# Patient Record
Sex: FEMALE | Race: BLACK OR AFRICAN AMERICAN | Employment: FULL TIME | URBAN - METROPOLITAN AREA
[De-identification: names, ages, dates, MRNs, and addresses within clinical notes are randomized per-mention and may not be internally consistent; named-entity substitution may affect disease eponyms.]

---

## 2017-06-27 ENCOUNTER — APPOINTMENT (OUTPATIENT)
Dept: CT IMAGING | Age: 27
End: 2017-06-27
Attending: PHYSICIAN ASSISTANT
Payer: MEDICAID

## 2017-06-27 ENCOUNTER — HOSPITAL ENCOUNTER (EMERGENCY)
Age: 27
Discharge: HOME | End: 2017-06-27
Attending: FAMILY MEDICINE
Payer: MEDICAID

## 2017-06-27 VITALS
HEART RATE: 80 BPM | BODY MASS INDEX: 44.3 KG/M2 | DIASTOLIC BLOOD PRESSURE: 81 MMHG | RESPIRATION RATE: 16 BRPM | OXYGEN SATURATION: 99 % | WEIGHT: 250 LBS | HEIGHT: 63 IN | SYSTOLIC BLOOD PRESSURE: 122 MMHG | TEMPERATURE: 98.6 F

## 2017-06-27 DIAGNOSIS — S05.11XA PERIORBITAL CONTUSION OF RIGHT EYE, INITIAL ENCOUNTER: Primary | ICD-10-CM

## 2017-06-27 DIAGNOSIS — H11.31 SUBCONJUNCTIVAL HEMORRHAGE OF RIGHT EYE: ICD-10-CM

## 2017-06-27 DIAGNOSIS — S01.81XA LACERATION OF FACE, INITIAL ENCOUNTER: ICD-10-CM

## 2017-06-27 DIAGNOSIS — Z23 NEED FOR TDAP VACCINATION: ICD-10-CM
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
## 2017-06-27 PROCEDURE — 90715 TDAP VACCINE 7 YRS/> IM: CPT | Performed by: PHYSICIAN ASSISTANT

## 2017-06-27 PROCEDURE — 70486 CT MAXILLOFACIAL W/O DYE: CPT

## 2017-06-27 PROCEDURE — 74011250636 HC RX REV CODE- 250/636: Performed by: PHYSICIAN ASSISTANT

## 2017-06-27 PROCEDURE — 99283 EMERGENCY DEPT VISIT LOW MDM: CPT

## 2017-06-27 PROCEDURE — 90471 IMMUNIZATION ADMIN: CPT

## 2017-06-27 RX ORDER — ERYTHROMYCIN 5 MG/G
OINTMENT OPHTHALMIC
Qty: 1 TUBE | Refills: 0 | Status: SHIPPED | OUTPATIENT
Start: 2017-06-27 | End: 2017-07-04

## 2017-06-27 RX ORDER — IBUPROFEN 600 MG/1
600 TABLET ORAL
Qty: 20 TAB | Refills: 0 | Status: SHIPPED | OUTPATIENT
Start: 2017-06-27 | End: 2017-12-01

## 2017-06-27 RX ORDER — HYDROCODONE BITARTRATE AND ACETAMINOPHEN 5; 325 MG/1; MG/1
1-2 TABLET ORAL
Qty: 6 TAB | Refills: 0 | Status: SHIPPED | OUTPATIENT
Start: 2017-06-27 | End: 2017-12-01

## 2017-06-27 RX ADMIN — TETANUS TOXOID, REDUCED DIPHTHERIA TOXOID AND ACELLULAR PERTUSSIS VACCINE, ADSORBED 0.5 ML: 5; 2.5; 8; 8; 2.5 SUSPENSION INTRAMUSCULAR at 11:38
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
## 2017-06-27 NOTE — DISCHARGE INSTRUCTIONS
Subconjunctival Hemorrhage: Care Instructions  Your Care Instructions    Sometimes small blood vessels in the white of the eye can break, causing a red spot or speck. This is called a subconjunctival hemorrhage. The blood vessels may break when you sneeze, cough, vomit, strain, or bend over. Sometimes there is no clear cause. The blood may look alarming, especially if the spot is large. If there is no pain or vision change, there is usually no reason to worry, and the blood slowly will go away on its own in 2 to 3 weeks. Follow-up care is a key part of your treatment and safety. Be sure to make and go to all appointments, and call your doctor if you are having problems. It's also a good idea to know your test results and keep a list of the medicines you take. How can you care for yourself at home? · Watch for changes in your eye. It is normal for the red spot on your eyeball to change color as it heals. Just like a bruise on your skin, it may change from red to brown to purple to yellow. · Do not take aspirin or products that contain aspirin, which can increase bleeding. Use acetaminophen (Tylenol) if you need pain relief for another problem. · Do not take two or more pain medicines at the same time unless the doctor told you to. Many pain medicines have acetaminophen, which is Tylenol. Too much acetaminophen (Tylenol) can be harmful. When should you call for help? Call your doctor now or seek immediate medical care if:  · You have signs of an eye infection, such as:  ¨ Pus or thick discharge coming from the eye. ¨ Redness or swelling around the eye. ¨ A fever. · You see blood over the black part of your eye (pupil). · You have any changes or problems in your vision. · You have any pain in your eye. Watch closely for changes in your health, and be sure to contact your doctor if:  · You do not get better as expected. Where can you learn more?   Go to http://pablo-willem.info/. Enter K927 in the search box to learn more about \"Subconjunctival Hemorrhage: Care Instructions. \"  Current as of: March 20, 2017  Content Version: 11.3  © 8050-3308 [employer]. Care instructions adapted under license by [employer] (which disclaims liability or warranty for this information). If you have questions about a medical condition or this instruction, always ask your healthcare professional. [address] any warranty or liability for your use of this information. Cuts: Care Instructions  Your Care Instructions  A cut can happen anywhere on your body. Stitches, staples, skin adhesives, or pieces of tape called Steri-Strips are sometimes used to keep the edges of a cut together and help it heal. Steri-Strips can be used by themselves or with stitches or staples. Sometimes cuts are left open. If the cut went deep and through the skin, the doctor may have closed the cut in two layers. A deeper layer of stitches brings the deep part of the cut together. These stitches will dissolve and don't need to be removed. The upper layer closure, which could be stitches, staples, Steri-Strips, or adhesive, is what you see on the cut. A cut is often covered by a bandage. The doctor has checked you carefully, but problems can develop later. If you notice any problems or new symptoms, get medical treatment right away. Follow-up care is a key part of your treatment and safety. Be sure to make and go to all appointments, and call your doctor if you are having problems. It's also a good idea to know your test results and keep a list of the medicines you take. How can you care for yourself at home? If a cut is open or closed  · Prop up the sore area on a pillow anytime you sit or lie down during the next 3 days. Try to keep it above the level of your heart. This will help reduce swelling.   · Keep the cut dry for the first 24 to 48 hours. After this, you can shower if your doctor okays it. Pat the cut dry. · Don't soak the cut, such as in a bathtub. Your doctor will tell you when it's safe to get the cut wet. · After the first 24 to 48 hours, clean the cut with soap and water 2 times a day unless your doctor gives you different instructions. ¨ Don't use hydrogen peroxide or alcohol, which can slow healing. ¨ You may cover the cut with a thin layer of petroleum jelly and a nonstick bandage. ¨ If the doctor put a bandage over the cut, put on a new bandage after cleaning the cut or if the bandage gets wet or dirty. · Avoid any activity that could cause your cut to reopen. · Be safe with medicines. Read and follow all instructions on the label. ¨ If the doctor gave you a prescription medicine for pain, take it as prescribed. ¨ If you are not taking a prescription pain medicine, ask your doctor if you can take an over-the-counter medicine. If the cut is closed with stitches, staples, or Steri-Strips  · Follow the above instructions for open or closed cuts. · Do not remove the stitches or staples on your own. Your doctor will tell you when to come back to have the stitches or staples removed. · Leave Steri-Strips on until they fall off. If the cut is closed with a skin adhesive  · Follow the above instructions for open or closed cuts. · Leave the skin adhesive on your skin until it falls off on its own. This may take 5 to 10 days. · Do not scratch, rub, or pick at the adhesive. · Do not put the sticky part of a bandage directly on the adhesive. · Do not put any kind of ointment, cream, or lotion over the area. This can make the adhesive fall off too soon. Do not use hydrogen peroxide or alcohol, which can slow healing. When should you call for help? Call your doctor now or seek immediate medical care if:  · You have new pain, or your pain gets worse. · The skin near the cut is cold or pale or changes color.   · You have tingling, weakness, or numbness near the cut. · The cut starts to bleed, and blood soaks through the bandage. Oozing small amounts of blood is normal.  · You have trouble moving the area near the cut. · You have symptoms of infection, such as:  ¨ Increased pain, swelling, warmth, or redness around the cut. ¨ Red streaks leading from the cut. ¨ Pus draining from the cut. ¨ A fever. Watch closely for changes in your health, and be sure to contact your doctor if:  · The cut reopens. · You do not get better as expected. Where can you learn more? Go to http://pablo-willem.info/. Enter M735 in the search box to learn more about \"Cuts: Care Instructions. \"  Current as of: March 20, 2017  Content Version: 11.3  © 6819-2620 [employer]. Care instructions adapted under license by [employer] (which disclaims liability or warranty for this information). If you have questions about a medical condition or this instruction, always ask your healthcare professional. [place] [address] any warranty or liability for your use of this information. Bruises: Care Instructions  Your Care Instructions    Bruises occur when small blood vessels under the skin tear or rupture, most often from a twist, bump, or fall. Blood leaks into tissues under the skin and causes a black-and-blue spot that often turns colors, including purplish black, reddish blue, or yellowish green, as the bruise heals. Bruises hurt, but most are not serious and will go away on their own within 2 to 4 weeks. Sometimes, gravity causes them to spread down the body. A leg bruise usually will take longer to heal than a bruise on the face or arms. Follow-up care is a key part of your treatment and safety. Be sure to make and go to all appointments, and call your doctor if you are having problems. Its also a good idea to know your test results and keep a list of the medicines you take.   How can you care for yourself at home? · Take pain medicines exactly as directed. ¨ If the doctor gave you a prescription medicine for pain, take it as prescribed. ¨ If you are not taking a prescription pain medicine, ask your doctor if you can take an over-the-counter medicine. · Put ice or a cold pack on the area for 10 to 20 minutes at a time. Put a thin cloth between the ice and your skin. · If you can, prop up the bruised area on pillows as much as possible for the next few days. Try to keep the bruise above the level of your heart. When should you call for help? Call your doctor now or seek immediate medical care if:  · You have signs of infection, such as:  ¨ Increased pain, swelling, warmth, or redness. ¨ Red streaks leading from the bruise. ¨ Pus draining from the bruise. ¨ A fever. · You have a bruise on your leg and signs of a blood clot, such as:  ¨ Increasing redness and swelling along with warmth, tenderness, and pain in the bruised area. ¨ Pain in your calf, back of the knee, thigh, or groin. ¨ Redness and swelling in your leg or groin. · Your pain gets worse. Watch closely for changes in your health, and be sure to contact your doctor if:  · You do not get better as expected. Where can you learn more? Go to http://pablo-willem.info/. Enter [phone] in the search box to learn more about \"Bruises: Care Instructions. \"  Current as of: March 20, 2017  Content Version: 11.3  © 2597-4656 [employer]. Care instructions adapted under license by [employer] (which disclaims liability or warranty for this information). If you have questions about a medical condition or this instruction, always ask your healthcare professional. [address] any warranty or liability for your use of this information.

## 2017-06-27 NOTE — LETTER
[facility] [place] 
[facility] 
[place] [id number] 
[phone] Work/School Note Date: 6/27/2017 To Whom It May concern: 
 
Virginie Cueva was seen and treated today in the emergency room by the following provider(s): 
Attending Provider: Diego Mayen MD 
Physician Assistant: Bird Knight PA-C. Virginie Cueva may return to work on 6/28/17. Sincerely, Toro Mackey PA-C
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
## 2017-12-01 ENCOUNTER — HOSPITAL ENCOUNTER (EMERGENCY)
Age: 27
Discharge: HOME | End: 2017-12-01
Attending: EMERGENCY MEDICINE
Payer: MEDICAID

## 2017-12-01 VITALS
HEART RATE: 82 BPM | DIASTOLIC BLOOD PRESSURE: 88 MMHG | RESPIRATION RATE: 16 BRPM | BODY MASS INDEX: 42.68 KG/M2 | OXYGEN SATURATION: 99 % | WEIGHT: 250 LBS | SYSTOLIC BLOOD PRESSURE: 141 MMHG | HEIGHT: 64 IN | TEMPERATURE: 98.4 F

## 2017-12-01 DIAGNOSIS — L02.412 ABSCESS OF LEFT AXILLA: Primary | ICD-10-CM

## 2017-12-01 PROCEDURE — 75810000289 HC I&D ABSCESS SIMP/COMP/MULT

## 2017-12-01 PROCEDURE — 99283 EMERGENCY DEPT VISIT LOW MDM: CPT

## 2017-12-01 PROCEDURE — 74011000250 HC RX REV CODE- 250: Performed by: PHYSICIAN ASSISTANT

## 2017-12-01 PROCEDURE — 74011250637 HC RX REV CODE- 250/637: Performed by: PHYSICIAN ASSISTANT

## 2017-12-01 RX ORDER — LIDOCAINE HYDROCHLORIDE 20 MG/ML
12 INJECTION, SOLUTION INFILTRATION; PERINEURAL
Status: COMPLETED | OUTPATIENT
Start: 2017-12-01 | End: 2017-12-01

## 2017-12-01 RX ORDER — HYDROCODONE BITARTRATE AND ACETAMINOPHEN 5; 325 MG/1; MG/1
1 TABLET ORAL
Qty: 20 TAB | Refills: 0 | Status: SHIPPED | OUTPATIENT
Start: 2017-12-01 | End: 2018-05-25

## 2017-12-01 RX ORDER — IBUPROFEN 600 MG/1
600 TABLET ORAL
Status: COMPLETED | OUTPATIENT
Start: 2017-12-01 | End: 2017-12-01

## 2017-12-01 RX ORDER — ACETAMINOPHEN AND CODEINE PHOSPHATE 300; 30 MG/1; MG/1
1 TABLET ORAL
COMMUNITY
End: 2018-05-25

## 2017-12-01 RX ORDER — SULFAMETHOXAZOLE AND TRIMETHOPRIM 800; 160 MG/1; MG/1
1 TABLET ORAL 2 TIMES DAILY
COMMUNITY
End: 2018-05-25

## 2017-12-01 RX ADMIN — IBUPROFEN 600 MG: 600 TABLET, FILM COATED ORAL at 23:26

## 2017-12-01 RX ADMIN — LIDOCAINE HYDROCHLORIDE 240 MG: 20 INJECTION, SOLUTION INFILTRATION; PERINEURAL at 22:32

## 2017-12-02 NOTE — ED PROVIDER NOTES
EMERGENCY DEPARTMENT HISTORY AND PHYSICAL EXAM    Date: 12/1/2017  Patient Name: Burak Hewitt    History of Presenting Illness     Chief Complaint   Patient presents with    Abscess         History Provided By: Patient    Chief Complaint: abscess  Duration: 7 Days  Timing:  Gradual  Location: left axilla  Associated Symptoms: burning to abscess area    Additional History (Context):   10:45 PM   Burak Hewitt is a 33 y.o. female who presents to the emergency department C/O abscess to left axilla for the past week. Associated sxs include burning to abscess area. Pt was seen at urgent care today where they attempted to fany abscess but pt states it was painful and she was not numbed properly so she did not finish with procedure. Pt was given bactrim and tylenol #3 yesterday when she was seen at urgent care. Pt denies fever and any other sxs or complaints. PCP: None    Current Facility-Administered Medications   Medication Dose Route Frequency Provider Last Rate Last Dose    ibuprofen (MOTRIN) tablet 600 mg  600 mg Oral NOW [place]         Current Outpatient Prescriptions   Medication Sig Dispense Refill    trimethoprim-sulfamethoxazole (BACTRIM DS) 160-800 mg per tablet Take 1 Tab by mouth two (2) times a day.  acetaminophen-codeine (TYLENOL-CODEINE #3) 300-30 mg per tablet Take 1 Tab by mouth every four (4) hours as needed for Pain.  HYDROcodone-acetaminophen (NORCO) 5-325 mg per tablet Take 1 Tab by mouth every four (4) hours as needed for Pain. Max Daily Amount: 6 Tabs.  20 Tab 0       Past History     Past Medical History:  Past Medical History:   Diagnosis Date    Depression     Morbid obesity ([address]     Smoking     Suicidal thoughts        Past Surgical History:  Past Surgical History:   Procedure Laterality Date    CYSTOSCOPY      stone extraction    HX CHOLECYSTECTOMY         Family History:  Family History   Problem Relation Age of Onset    Obesity Father        Social History:  Social History   Substance Use Topics    Smoking status: Former Smoker     Packs/day: 0.25    Smokeless tobacco: None    Alcohol use 1.0 oz/week     2 Cans of beer per week      Comment: social pt reports seldom use can not recall last use       Allergies:  No Known Allergies      Review of Systems   Review of Systems   Constitutional: Negative for fever. Skin:        (+) abscess   All other systems reviewed and are negative. Physical Exam     Vitals:    12/01/17 2222   BP: (!) 165/114   Pulse: (!) 101   Resp: 16   Temp: 98.4 °F (36.9 °C)   SpO2: 100%   Weight: 113.4 kg (250 lb)   Height: 5' 4\" (1.626 m)     Physical Exam   Constitutional: She is oriented to person, place, and time. She appears well-developed and well-nourished. No distress. HENT:   Head: Normocephalic and atraumatic. Eyes: Conjunctivae and EOM are normal. Pupils are equal, round, and reactive to light. Neck: Normal range of motion. Neck supple. Cardiovascular: Normal rate and regular rhythm. Pulmonary/Chest: Effort normal and breath sounds normal.   Musculoskeletal: Normal range of motion. Neurological: She is alert and oriented to person, place, and time. Skin: Skin is warm and dry. Right axilla with 4cm raised fluctuant tender asbcess   Psychiatric: She has a normal mood and affect. Her behavior is normal.   Nursing note and vitals reviewed. Diagnostic Study Results     Labs -   No results found for this or any previous visit (from the past 12 hour(s)). Radiologic Studies -   No orders to display     CT Results  (Last 48 hours)    None        CXR Results  (Last 48 hours)    None          Medications given in the ED-  Medications   ibuprofen (MOTRIN) tablet 600 mg (not administered)   lidocaine (XYLOCAINE) 20 mg/mL (2 %) injection 240 mg (240 mg SubCUTAneous Given by Provider 12/1/17 2232)         Medical Decision Making   I am the first provider for this patient.     I reviewed the vital signs, available nursing notes, past medical history, past surgical history, family history and social history. Vital Signs-Reviewed the patient's vital signs. Procedures:  I&D Abcess Complex  Date/Time: 12/1/2017 10:49 PM  Performed by: Octavio Tolbert  Authorized by: Bull Steven     Consent:     Consent obtained:  Verbal    Consent given by:  Patient  Location:     Type:  Abscess    Location: left axilla. Pre-procedure details:     Skin preparation:  Betadine  Anesthesia (see MAR for exact dosages): Anesthesia method:  Local infiltration    Local anesthetic:  Lidocaine 2% w/o epi  Procedure type:     Complexity:  Simple  Procedure details:     Needle aspiration: no      Incision types:  Stab incision    Incision depth:  Dermal    Scalpel blade:  11    Wound management:  Probed and deloculated    Drainage:  Bloody and purulent    Drainage amount:  Copious    Wound treatment:  Wound left open    Packing materials:  None  Post-procedure details:     Patient tolerance of procedure: Tolerated well, no immediate complications          ED Course:   10:45 PM Initial assessment performed. The patients presenting problems have been discussed, and they are in agreement with the care plan formulated and outlined with them. I have encouraged them to ask questions as they arise throughout their visit. Diagnosis and Disposition       DISCHARGE NOTE:  11:20 PM  Pau Bartlett  results have been reviewed with her. She has been counseled regarding her diagnosis, treatment, and plan. She verbally conveys understanding and agreement of the signs, symptoms, diagnosis, treatment and prognosis and additionally agrees to follow up as discussed. She also agrees with the care-plan and conveys that all of her questions have been answered.   I have also provided discharge instructions for her that include: educational information regarding their diagnosis and treatment, and list of reasons why they would want to return to the ED prior to their follow-up appointment, should her condition change. She has been provided with education for proper emergency department utilization. CLINICAL IMPRESSION:    1. Abscess of left axilla        PLAN:  1. D/C Home  2. Current Discharge Medication List      START taking these medications    Details   HYDROcodone-acetaminophen (NORCO) 5-325 mg per tablet Take 1 Tab by mouth every four (4) hours as needed for Pain. Max Daily Amount: 6 Tabs. Qty: 20 Tab, Refills: 0           3. Follow-up Information     Follow up With Details Comments Contact Info    [facility] Schedule an appointment as soon as possible for a visit in 2 days  [id number] [facility], [address] [facility] [address]    [facility]  As needed, If symptoms worsen [address] [id number] [phone]        _______________________________    Attestations: This note is prepared by Liliane Shrestha , acting as Scribe for Jian Guerin PA-C. Jian Guerin PA-C:  The scribe's documentation has been prepared under my direction and personally reviewed by me in its entirety.   I confirm that the note above accurately reflects all work, treatment, procedures, and medical decision making performed by me.  _______________________________

## 2017-12-02 NOTE — DISCHARGE INSTRUCTIONS
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
## 2018-05-25 ENCOUNTER — HOSPITAL ENCOUNTER (EMERGENCY)
Age: 28
Discharge: HOME | End: 2018-05-25
Attending: EMERGENCY MEDICINE
Payer: SELF-PAY

## 2018-05-25 VITALS
BODY MASS INDEX: 44.3 KG/M2 | HEIGHT: 63 IN | TEMPERATURE: 98.6 F | HEART RATE: 86 BPM | SYSTOLIC BLOOD PRESSURE: 123 MMHG | RESPIRATION RATE: 16 BRPM | DIASTOLIC BLOOD PRESSURE: 76 MMHG | WEIGHT: 250 LBS | OXYGEN SATURATION: 99 %

## 2018-05-25 DIAGNOSIS — S39.012A STRAIN OF LUMBAR REGION, INITIAL ENCOUNTER: Primary | ICD-10-CM

## 2018-05-25 LAB
APPEARANCE UR: ABNORMAL
BACTERIA URNS QL MICRO: ABNORMAL /HPF
BILIRUB UR QL: NEGATIVE
COLOR UR: YELLOW
EPITH CASTS URNS QL MICRO: ABNORMAL /LPF (ref 0–5)
GLUCOSE UR STRIP.AUTO-MCNC: NEGATIVE MG/DL
HCG UR QL: NEGATIVE
HGB UR QL STRIP: ABNORMAL
KETONES UR QL STRIP.AUTO: NEGATIVE MG/DL
LEUKOCYTE ESTERASE UR QL STRIP.AUTO: ABNORMAL
NITRITE UR QL STRIP.AUTO: NEGATIVE
PH UR STRIP: 6 [PH] (ref 5–8)
PROT UR STRIP-MCNC: NEGATIVE MG/DL
RBC #/AREA URNS HPF: ABNORMAL /HPF (ref 0–5)
SP GR UR REFRACTOMETRY: 1.02 (ref 1–1.03)
UROBILINOGEN UR QL STRIP.AUTO: 1 EU/DL (ref 0.2–1)
WBC URNS QL MICRO: ABNORMAL /HPF (ref 0–5)

## 2018-05-25 PROCEDURE — 81025 URINE PREGNANCY TEST: CPT

## 2018-05-25 PROCEDURE — 99282 EMERGENCY DEPT VISIT SF MDM: CPT

## 2018-05-25 PROCEDURE — 81001 URINALYSIS AUTO W/SCOPE: CPT | Performed by: EMERGENCY MEDICINE

## 2018-05-25 RX ORDER — CYCLOBENZAPRINE HCL 10 MG
10 TABLET ORAL 2 TIMES DAILY
Qty: 12 TAB | Refills: 0 | Status: SHIPPED | OUTPATIENT
Start: 2018-05-25 | End: 2018-08-18

## 2018-05-25 RX ORDER — NAPROXEN 500 MG/1
500 TABLET ORAL 2 TIMES DAILY WITH MEALS
Qty: 20 TAB | Refills: 0 | Status: SHIPPED | OUTPATIENT
Start: 2018-05-25 | End: 2018-08-18

## 2018-05-25 NOTE — ED PROVIDER NOTES
EMERGENCY DEPARTMENT HISTORY AND PHYSICAL EXAM    Date: 5/25/2018  Patient Name: Eleuterio Gil    History of Presenting Illness     Chief Complaint   Patient presents with    Back Pain         History Provided By: Patient    Chief Complaint: back pain  Duration: 2 Days  Timing:  Acute  Location: lower  Quality: Burning  Severity: 6 out of 10  Associated Symptoms: denies any other associated signs or symptoms    Additional History (Context):   2:02 PM  Eleuterio Gil is a 33 y.o. female who presents to the emergency department C/O 6/10 lower back pain, described as burning, onset 2 days ago s/p lifting a heavy box. No associated symptoms. Pt is not concerned for pregnancy. Birth control implant. Pt denies incontinence, numbness, weakness, and any other Sx or complaints. PCP: None      Past History     Past Medical History:  Past Medical History:   Diagnosis Date    Depression     Morbid obesity ([address]     Smoking     Suicidal thoughts        Past Surgical History:  Past Surgical History:   Procedure Laterality Date    CYSTOSCOPY      stone extraction    HX CHOLECYSTECTOMY         Family History:  Family History   Problem Relation Age of Onset    Obesity Father        Social History:  Social History   Substance Use Topics    Smoking status: Former Smoker     Packs/day: 0.25    Smokeless tobacco: Not on file    Alcohol use 1.0 oz/week     2 Cans of beer per week      Comment: social pt reports seldom use can not recall last use       Allergies:  No Known Allergies      Review of Systems   Review of Systems   Gastrointestinal:        (-) bowel incontinence   Genitourinary:        (-) urinary incontinence   Musculoskeletal: Positive for back pain. Neurological: Negative for weakness and numbness. All other systems reviewed and are negative.       Physical Exam     Vitals:    05/25/18 1346   BP: 123/76   Pulse: 86   Resp: 16   Temp: 98.6 °F (37 °C)   SpO2: 99%   Weight: 113.4 kg (250 lb)   Height: 5' 3\" (1.6 m)     Physical Exam   Constitutional: She is oriented to person, place, and time. She appears well-developed and well-nourished. No distress. Obese in NAD   HENT:   Head: Normocephalic and atraumatic. Eyes: Conjunctivae and EOM are normal. Pupils are equal, round, and reactive to light. Neck: Normal range of motion. Neck supple. Cardiovascular: Normal rate and regular rhythm. Pulmonary/Chest: Effort normal and breath sounds normal.   Abdominal: Soft. Bowel sounds are normal. She exhibits no distension. There is no tenderness. There is no rebound and no guarding. Musculoskeletal: Normal range of motion. She exhibits tenderness. She exhibits no edema. Cervical back: Normal.        Thoracic back: Normal.        Lumbar back: She exhibits tenderness and pain. She exhibits no bony tenderness. Back:    Negative SLR BLE's, NVI   Neurological: She is alert and oriented to person, place, and time. Skin: Skin is warm and dry. Psychiatric: She has a normal mood and affect. Her behavior is normal.   Nursing note and vitals reviewed.         Diagnostic Study Results     Labs -     Recent Results (from the past 12 hour(s))   URINALYSIS W/ RFLX MICROSCOPIC    Collection Time: 05/25/18  1:50 PM   Result Value Ref Range    Color YELLOW      Appearance TURBID      Specific gravity 1.024 1.005 - 1.030      pH (UA) 6.0 5.0 - 8.0      Protein NEGATIVE  NEG mg/dL    Glucose NEGATIVE  NEG mg/dL    Ketone NEGATIVE  NEG mg/dL    Bilirubin NEGATIVE  NEG      Blood TRACE (A) NEG      Urobilinogen 1.0 0.2 - 1.0 EU/dL    Nitrites NEGATIVE  NEG      Leukocyte Esterase MODERATE (A) NEG     URINE MICROSCOPIC ONLY    Collection Time: 05/25/18  1:50 PM   Result Value Ref Range    WBC 11 to 20 0 - 5 /hpf    RBC 0 to 3 0 - 5 /hpf    Epithelial cells 4+ 0 - 5 /lpf    Bacteria 1+ (A) NEG /hpf   HCG URINE, QL. - POC    Collection Time: 05/25/18  1:51 PM   Result Value Ref Range    Pregnancy test,urine (POC) NEGATIVE  NEG Radiologic Studies -   No orders to display     CT Results  (Last 48 hours)    None        CXR Results  (Last 48 hours)    None          Medications given in the ED-  Medications - No data to display      Medical Decision Making   I am the first provider for this patient. I reviewed the vital signs, available nursing notes, past medical history, past surgical history, family history and social history. Vital Signs-Reviewed the patient's vital signs. Pulse Oximetry Analysis - 99% on RA     Records Reviewed: Nursing Notes and Old Medical Records    Procedures:  Procedures    ED Course:   2:02 PM Initial assessment performed. The patients presenting problems have been discussed, and they are in agreement with the care plan formulated and outlined with them. I have encouraged them to ask questions as they arise throughout their visit. Diagnosis and Disposition       DISCHARGE NOTE:  2:08 PM  Romy Kuo  results have been reviewed with her. She has been counseled regarding her diagnosis, treatment, and plan. She verbally conveys understanding and agreement of the signs, symptoms, diagnosis, treatment and prognosis and additionally agrees to follow up as discussed. She also agrees with the care-plan and conveys that all of her questions have been answered. I have also provided discharge instructions for her that include: educational information regarding their diagnosis and treatment, and list of reasons why they would want to return to the ED prior to their follow-up appointment, should her condition change. She has been provided with education for proper emergency department utilization. CLINICAL IMPRESSION:    1. Strain of lumbar region, initial encounter        PLAN:  1. D/C Home  2. Current Discharge Medication List      START taking these medications    Details   cyclobenzaprine (FLEXERIL) 10 mg tablet Take 1 Tab by mouth two (2) times a day.   Qty: 12 Tab, Refills: 0      naproxen (NAPROSYN) 500 mg tablet Take 1 Tab by mouth two (2) times daily (with meals). Qty: 20 Tab, Refills: 0           3. Follow-up Information     Follow up With Details Comments Contact Info    Yashira Paniagua MD Schedule an appointment as soon as possible for a visit For primary care follow up [id number] [facility] [address]      [facility] EM[facility] Go to As needed, as symptoms worsen [address] [id number]  [phone]        _______________________________    Attestations: This note is prepared by Monisha Dai, acting as Scribe for Jian Guerin PA-C. Jian Guerin PA-C:  The scribe's documentation has been prepared under my direction and personally reviewed by me in its entirety.   I confirm that the note above accurately reflects all work, treatment, procedures, and medical decision making performed by me.  _______________________________

## 2018-05-25 NOTE — DISCHARGE INSTRUCTIONS
Back Strain: Care Instructions  Your Care Instructions    Back strain happens when you overstretch, or pull, a muscle in your back. You may hurt your back in an accident or when you exercise or lift something. Most back pain will get better with rest and time. You can take care of yourself at home to help your back heal.  Follow-up care is a key part of your treatment and safety. Be sure to make and go to all appointments, and call your doctor if you are having problems. It's also a good idea to know your test results and keep a list of the medicines you take. How can you care for yourself at home? · Try to stay as active as you can, but stop or reduce any activity that causes pain. · Put ice or a cold pack on the sore muscle for 10 to 20 minutes at a time to stop swelling. Try this every 1 to 2 hours for 3 days (when you are awake) or until the swelling goes down. Put a thin cloth between the ice pack and your skin. · After 2 or 3 days, apply a heating pad on low or a warm cloth to your back. Some doctors suggest that you go back and forth between hot and cold treatments. · Take pain medicines exactly as directed. ¨ If the doctor gave you a prescription medicine for pain, take it as prescribed. ¨ If you are not taking a prescription pain medicine, ask your doctor if you can take an over-the-counter medicine. · Try sleeping on your side with a pillow between your legs. Or put a pillow under your knees when you lie on your back. These measures can ease pain in your lower back. · Return to your usual level of activity slowly. When should you call for help? Call 911 anytime you think you may need emergency care. For example, call if:  ? · You are unable to move a leg at all. ?Call your doctor now or seek immediate medical care if:  ? · You have new or worse symptoms in your legs, belly, or buttocks. Symptoms may include:  ¨ Numbness or tingling. ¨ Weakness. ¨ Pain.    ? · You lose bladder or bowel control. ? Watch closely for changes in your health, and be sure to contact your doctor if you are not getting better as expected. Where can you learn more? Go to http://pablo-willem.info/. Enter R451 in the search box to learn more about \"Back Strain: Care Instructions. \"  Current as of: March 21, 2017  Content Version: 11.4  © 6715-6430 [employer]. Care instructions adapted under license by [employer] (which disclaims liability or warranty for this information). If you have questions about a medical condition or this instruction, always ask your healthcare professional. [place] [address] any warranty or liability for your use of this information.

## 2018-05-25 NOTE — LETTER
[facility] [place] 
[facility] EMERGENCY DEPT 
[address] [address] 
[phone] Work/School Note Date: 5/25/2018 To Whom It May concern: 
 
Feliz Kerr was seen and treated today in the emergency room by the following provider(s): 
Attending Provider: Oscar Campo MD 
Physician Assistant: CASYS Pedro. Feliz Kerr may return to work on 5/26/18.  
 
Sincerely, 
 
 
 
 
Lenka Champion PA-C

## 2018-08-18 ENCOUNTER — HOSPITAL ENCOUNTER (EMERGENCY)
Age: 28
Discharge: HOME | End: 2018-08-18
Attending: INTERNAL MEDICINE
Payer: SELF-PAY

## 2018-08-18 VITALS
HEIGHT: 63 IN | BODY MASS INDEX: 44.3 KG/M2 | RESPIRATION RATE: 16 BRPM | DIASTOLIC BLOOD PRESSURE: 82 MMHG | HEART RATE: 74 BPM | TEMPERATURE: 98.3 F | WEIGHT: 250 LBS | SYSTOLIC BLOOD PRESSURE: 126 MMHG | OXYGEN SATURATION: 100 %

## 2018-08-18 DIAGNOSIS — N94.9 GENITAL LESION, FEMALE: Primary | ICD-10-CM

## 2018-08-18 LAB — HCG UR QL: NEGATIVE

## 2018-08-18 PROCEDURE — 99284 EMERGENCY DEPT VISIT MOD MDM: CPT

## 2018-08-18 PROCEDURE — 81025 URINE PREGNANCY TEST: CPT

## 2018-08-18 PROCEDURE — 86780 TREPONEMA PALLIDUM: CPT | Performed by: PHYSICIAN ASSISTANT

## 2018-08-18 PROCEDURE — 74011000250 HC RX REV CODE- 250: Performed by: PHYSICIAN ASSISTANT

## 2018-08-18 PROCEDURE — 87529 HSV DNA AMP PROBE: CPT | Performed by: PHYSICIAN ASSISTANT

## 2018-08-18 PROCEDURE — 74011250637 HC RX REV CODE- 250/637: Performed by: PHYSICIAN ASSISTANT

## 2018-08-18 RX ORDER — CEPHALEXIN 500 MG/1
500 CAPSULE ORAL 4 TIMES DAILY
Qty: 28 CAP | Refills: 0 | Status: SHIPPED | OUTPATIENT
Start: 2018-08-18 | End: 2018-08-25

## 2018-08-18 RX ORDER — LIDOCAINE HYDROCHLORIDE 20 MG/ML
JELLY TOPICAL
Status: COMPLETED | OUTPATIENT
Start: 2018-08-18 | End: 2018-08-18

## 2018-08-18 RX ORDER — HYDROCODONE BITARTRATE AND ACETAMINOPHEN 5; 325 MG/1; MG/1
1 TABLET ORAL
Status: COMPLETED | OUTPATIENT
Start: 2018-08-18 | End: 2018-08-18

## 2018-08-18 RX ORDER — IBUPROFEN 600 MG/1
600 TABLET ORAL
Qty: 20 TAB | Refills: 0 | Status: SHIPPED | OUTPATIENT
Start: 2018-08-18 | End: 2022-01-02

## 2018-08-18 RX ORDER — HYDROCODONE BITARTRATE AND ACETAMINOPHEN 5; 325 MG/1; MG/1
1 TABLET ORAL
Qty: 8 TAB | Refills: 0 | Status: SHIPPED | OUTPATIENT
Start: 2018-08-18 | End: 2022-01-02

## 2018-08-18 RX ORDER — SULFAMETHOXAZOLE AND TRIMETHOPRIM 800; 160 MG/1; MG/1
1 TABLET ORAL 2 TIMES DAILY
Qty: 14 TAB | Refills: 0 | Status: SHIPPED | OUTPATIENT
Start: 2018-08-18 | End: 2018-08-25

## 2018-08-18 RX ADMIN — LIDOCAINE HYDROCHLORIDE: 20 JELLY TOPICAL at 10:34

## 2018-08-18 RX ADMIN — HYDROCODONE BITARTRATE AND ACETAMINOPHEN 1 TABLET: 5; 325 TABLET ORAL at 10:34

## 2018-08-18 NOTE — Clinical Note
SPECIFIC PATIENT INSTRUCTIONS FROM THE PHYSICIAN WHO TREATED YOU IN THE ER TODAY: 
1. Return if worsening pain, redness, warmth, discharge, fevers or for any symptoms which worry you. 2. Keflex and Bactrim DS as prescribed until finished. 3. Use a war m compress, such as a damp wash cloth warmed in the microwave, applied to the abscess area 4-6 times a day to help bring it to a head and open. 4. You may take over the counter pain reliever for pain control. Norco for more severe pain. 5. Follow up w ith your primary doctor or return to the ER in 1-2 days for wound check.

## 2018-08-18 NOTE — DISCHARGE INSTRUCTIONS

## 2018-08-18 NOTE — ED PROVIDER NOTES
EMERGENCY DEPARTMENT HISTORY AND PHYSICAL EXAM    Date: 8/18/2018  Patient Name: Duyen Cueva    History of Presenting Illness     Chief Complaint   Patient presents with    Abscess         History Provided By: Patient    Chief Complaint: Abscess  Duration: 1 Weeks  Timing:  Acute and Worsening  Location: Left lower vagina  Quality: Pressure  Severity: 8 out of 10  Associated Symptoms: denies any other associated signs or symptoms    Additional History (Context):   10:13 AM  Duyen Cueva is a 33 y.o. female with PMHx of depression who presents to the emergency department C/O worsening abscess to left labia described as pressure onset 1 week ago. No associated sxs. Pt reports she had intercourse recently and noticed an abscess to her left lower vagina. Pt reports Hx of one prior abscess to her left axilla. Pt is currently on her menses. Pt denies fever, drainage from the abscess, Hx MRSA, dysuria, frequency, and any other sxs or complaints. She took 200mg ibuprofen this morning without relief. PCP: None        Past History     Past Medical History:  Past Medical History:   Diagnosis Date    Depression     Morbid obesity ([address]     Smoking     Suicidal thoughts        Past Surgical History:  Past Surgical History:   Procedure Laterality Date    CYSTOSCOPY      stone extraction    HX CHOLECYSTECTOMY         Family History:  Family History   Problem Relation Age of Onset    Obesity Father        Social History:  Social History   Substance Use Topics    Smoking status: Former Smoker     Packs/day: 0.25    Smokeless tobacco: Never Used    Alcohol use No      Comment: social pt reports seldom use can not recall last use       Allergies:  No Known Allergies      Review of Systems   Review of Systems   Constitutional: Negative for fever. Genitourinary: Positive for genital sores. Negative for dysuria and frequency.    Skin:        (+) Abscess to right lower vagina without drainage   All other systems reviewed and are negative. Physical Exam     Vitals:    08/18/18 0949   BP: 126/82   Pulse: 74   Resp: 16   Temp: 98.3 °F (36.8 °C)   SpO2: 100%   Weight: 113.4 kg (250 lb)   Height: 5' 3\" (1.6 m)     Physical Exam   Constitutional: She appears well-developed and well-nourished. No distress. HENT:   Head: Normocephalic and atraumatic. Right Ear: External ear normal.   Left Ear: External ear normal.   Nose: Nose normal.   Eyes: Conjunctivae are normal.   Neck: Normal range of motion. Cardiovascular: Normal rate, regular rhythm and normal heart sounds. Pulmonary/Chest: Effort normal and breath sounds normal. No respiratory distress. She has no wheezes. Genitourinary: Vagina normal.   Genitourinary Comments: Small, dime sized ulcerative area with fluctuance to the left labia majora. With palpation, purulent drainage is expressed. No other lesions appreciated. Neurological: She is alert. Skin: Skin is warm and dry. She is not diaphoretic. Psychiatric: She has a normal mood and affect. Nursing note and vitals reviewed. Diagnostic Study Results     Labs -     Recent Results (from the past 12 hour(s))   HCG URINE, QL. - POC    Collection Time: 08/18/18 10:44 AM   Result Value Ref Range    Pregnancy test,urine (POC) NEGATIVE  NEG         Radiologic Studies -   No orders to display     CT Results  (Last 48 hours)    None        CXR Results  (Last 48 hours)    None          Medications given in the ED-  Medications   lidocaine (XYLOCAINE) 2 % jelly ( Topical Given 8/18/18 1034)   HYDROcodone-acetaminophen (NORCO) 5-325 mg per tablet 1 Tab (1 Tab Oral Given 8/18/18 1034)         Medical Decision Making   I am the first provider for this patient. I reviewed the vital signs, available nursing notes, past medical history, past surgical history, family history and social history. Vital Signs-Reviewed the patient's vital signs.     Pulse Oximetry Analysis - 100% on RA       Records Reviewed: Nursing Notes and Old Medical Records    Provider Notes (Medical Decision Making): Appears well and non-toxic, presenting with ulcerative lesion to the left labia with purulence expressed with palpation. Most c/w abscess but as it is ulcerative, lesion swabbed for HSV and syphilis blood test sent to lab. Due to pain, patient declined I&D today and terminated expression by palpation. As it is draining on its own, I feel she can try warm compresses and abx, return for worsening symptoms. Pt agrees with plan. Based on today's assessment, I feel the patient is stable for discharge to home with outpatient follow up. Return precautions and referrals provided. Procedures:  I&D Abcess Simple  Date/Time: 8/18/2018 11:23 AM  Performed by: Ava Faith  Authorized by: Ava Faith     Consent:     Consent obtained:  Verbal    Consent given by:  Patient    Risks discussed:  Incomplete drainage    Alternatives discussed:  Delayed treatment  Location:     Type:  Abscess    Location:  Anogenital    Anogenital location: labia - left. Pre-procedure details:     Skin preparation:  Betadine  Anesthesia (see MAR for exact dosages): Anesthesia method:  None  Procedure type:     Complexity:  Simple  Procedure details:     Drainage:  Purulent    Drainage amount:  Scant  Post-procedure details:     Patient tolerance of procedure:  Procedure terminated at patient's request  Comments:      Area cleaned with betadine, purulent drainage expressed with palpation. No incision was made. Procedure was terminated by the patient's request d/t pain. ED Course:   10:13 AM Initial assessment performed. The patients presenting problems have been discussed, and they are in agreement with the care plan formulated and outlined with them. I have encouraged them to ask questions as they arise throughout their visit. Diagnosis and Disposition       DISCHARGE NOTE:  11:32 AM  Sammikevin Engel  results have been reviewed with her.   She has been counseled regarding her diagnosis, treatment, and plan. She verbally conveys understanding and agreement of the signs, symptoms, diagnosis, treatment and prognosis and additionally agrees to follow up as discussed. She also agrees with the care-plan and conveys that all of her questions have been answered. I have also provided discharge instructions for her that include: educational information regarding their diagnosis and treatment, and list of reasons why they would want to return to the ED prior to their follow-up appointment, should her condition change. She has been provided with education for proper emergency department utilization. CLINICAL IMPRESSION:    1. Genital lesion, female        PLAN:  1. D/C Home  2. Discharge Medication List as of 8/18/2018 11:34 AM      START taking these medications    Details   cephALEXin (KEFLEX) 500 mg capsule Take 1 Cap by mouth four (4) times daily for 7 days. , Print, Disp-28 Cap, R-0      trimethoprim-sulfamethoxazole (BACTRIM DS) 160-800 mg per tablet Take 1 Tab by mouth two (2) times a day for 7 days. , Print, Disp-14 Tab, R-0      HYDROcodone-acetaminophen (NORCO) 5-325 mg per tablet Take 1 Tab by mouth every four (4) hours as needed for Pain. Max Daily Amount: 6 Tabs., Print, Disp-8 Tab, R-0      ibuprofen (MOTRIN) 600 mg tablet Take 1 Tab by mouth every six (6) hours as needed for Pain., Print, Disp-20 Tab, R-0           3. Follow-up Information     Follow up With Details Comments Contact Info    [facility] Schedule an appointment as soon as possible for a visit in 3 days For primary care follow up. [id number] [facility], [address] [facility] [address] [facility] [address]    [facility] EMERGENCY DEPT Go to As needed, If symptoms worsen [address]  [address] [facility] [address] [phone]        _______________________________    Attestations: This note is prepared by Ming Gonzales, acting as Scribe for KIA Anthony INGE Quinones PA-C:  The scribe's documentation has been prepared under my direction and personally reviewed by me in its entirety.   I confirm that the note above accurately reflects all work, treatment, procedures, and medical decision making performed by me.  _______________________________

## 2018-08-19 LAB — T PALLIDUM AB SER QL IA: NONREACTIVE

## 2018-08-22 LAB
HSV1 DNA SPEC QL NAA+PROBE: NEGATIVE
HSV2 DNA SPEC QL NAA+PROBE: NEGATIVE
SPECIMEN SOURCE: NORMAL

## 2020-06-22 ENCOUNTER — VIRTUAL VISIT (OUTPATIENT)
Dept: SURGERY | Age: 30
End: 2020-06-22

## 2020-06-22 VITALS — HEIGHT: 63 IN | BODY MASS INDEX: 46.25 KG/M2 | WEIGHT: 261 LBS

## 2020-06-22 DIAGNOSIS — Z87.891 FORMER SMOKER: ICD-10-CM

## 2020-06-22 DIAGNOSIS — E66.01 MORBID OBESITY (HCC): Primary | ICD-10-CM

## 2020-06-22 DIAGNOSIS — F32.A DEPRESSION, UNSPECIFIED DEPRESSION TYPE: ICD-10-CM

## 2020-06-22 DIAGNOSIS — N20.0 CALCULUS OF KIDNEY: ICD-10-CM

## 2020-06-22 DIAGNOSIS — E66.01 MORBID OBESITY WITH BMI OF 45.0-49.9, ADULT (HCC): ICD-10-CM

## 2020-06-22 DIAGNOSIS — D50.9 IRON DEFICIENCY ANEMIA, UNSPECIFIED IRON DEFICIENCY ANEMIA TYPE: ICD-10-CM

## 2020-06-22 NOTE — PROGRESS NOTES
Bariatric Surgery Consultation    Subjective: The patient is a 35 y.o. obese female with a Body mass index is 46.23 kg/m². .  The patient is at her heaviest weight for the past 6 years. she has been overweight since age 26.   she has been considering surgery since last 6 years. she desires surgery at this time because of multiple health concerns and their lifestyle issues which are hindered by their weight. she has been referred by his family physician Dr Santosh Strauss for evaluation and treatment of their obesity via surgical intervention. Laverle Lundborg has tried multiple diets in her lifetime most recently tried physician supervised, behavior modification, unsupervised diets and Atkins    Bariatric comorbidities present are   Patient Active Problem List   Diagnosis Code    Morbid obesity ([address] E66.01    Smoking F17.200    Morbid obesity ([address] E66.01    Depression F32.9    Anemia D64.9    Former smoker Z87.891    Calculus of kidney N20.0    Morbid obesity with BMI of 45.0-49.9, adult ([facility] E66.01, Z68.42       The patient is considering laparoscopic sleeve gastrectomy for surgical weight loss due to their ineffective progress with medical forms of weight loss and the urging of their physician who cares for their primary medical issues. The patient  now presents  for consideration for weight loss surgery understanding the benefits of this over a medical approach of weight loss as was discussed in our presentation on weight loss surgery. They have discussed their plans both with their family and primary care physician who is in support of their pursuit of such. The patient has   had health issues as of late and denies and gastrointestinal disturbances other than what is outlined below in their review of symptoms. All of their prior evaluations available by both their PCP's and specialists physicians have been reviewed today either in the Care Everywhere portal or scanned under the media tab.     I have spent a large portion of my initial consultation today reviewing the patients current dietary habits which have contributed to their health issues and obesity. I have suggested to them personally a dietary regimen that they can initiate now to help with their status as it pertains to their weight. They understand that the most important aspect of their journey through their weight loss endeavor will be their adherence to a new lifestyle of healthy eating behavior. They also understand that an adherence to an exercise program will not only help with weight loss but is ultimately important in weight maintenance. The patients goal weight is 165lbslb. These goals are consistent with expected outcomes of their desired operation. her Medical goals are resolution of these health issues. Patient Active Problem List    Diagnosis Date Noted    Anemia     Former smoker     Calculus of kidney     Morbid obesity with BMI of 45.0-49.9, adult ([facility]     Depression 12/16/2015    Morbid obesity ([facility]     Smoking     Morbid obesity ([facility] 04/16/2013     Past Surgical History:   Procedure Laterality Date    CYSTOSCOPY      stone extraction    HX CHOLECYSTECTOMY        Social History     Tobacco Use    Smoking status: Former Smoker     Packs/day: 0.25     Last attempt to quit: 6/22/2017     Years since quitting: 3.0    Smokeless tobacco: Never Used   Substance Use Topics    Alcohol use: No     Alcohol/week: 1.7 standard drinks     Types: 2 Cans of beer per week     Comment: social pt reports seldom use can not recall last use      Family History   Problem Relation Age of Onset    Obesity Father       Current Outpatient Medications   Medication Sig Dispense Refill    ibuprofen (MOTRIN) 600 mg tablet Take 1 Tab by mouth every six (6) hours as needed for Pain. 20 Tab 0    HYDROcodone-acetaminophen (NORCO) 5-325 mg per tablet Take 1 Tab by mouth every four (4) hours as needed for Pain. Max Daily Amount: 6 Tabs.  8 Tab 0     No Known Allergies       Review of Systems:       General - No history or complaints of unexpected fever, chills, or weight loss  Head/Neck - No history or complaints of headache, diplopia, dysphagia, hearing loss  Cardiac - No history or complaints of chest pain, palpitations, murmur, or shortness of breath  Pulmonary - No history or complaints of shortness of breath, productive cough, hemoptysis  Gastrointestinal - mild reflux,no  abdominal pain, obstipation/constipation or blood per rectum  Genitourinary - No history or complaints of hematuria/dysuria, stress urinary incontinence symptoms, or renal lithiasis  Musculoskeletal - minimal joint pain ,  no muscular weakness  Hematologic - No history or complaints of bleeding disorders,  No blood transfusions  Neurologic - No history or complaints of  migraine headaches, seizure activity, syncopal episodes, TIA or stroke  Integumentary - No history or complaints of rashes, abnormal nevi, skin cancer  Gynecological - normal menses               Objective:     Visit Vitals   5' 3\" (1.6 m)   Wt 118.4 kg (261 lb)   BMI 46.23 kg/m²       Physical Examination:       Physical Examination: General appearance - alert, well appearing, and in no distress and oriented to person, place, and time  Mental status - alert, oriented to person, place, and time, normal mood, behavior, speech, dress, motor activity, and thought processes  Eyes - pupils equal and reactive, extraocular eye movements intact, sclera anicteric, left eye normal, right eye normal  Ears - right ear normal, left ear normal  Neck- good extension and flexion, no obvious swelling  Chest - good air movement  Heart - N/A  Abdomen - no obvious distension.   Neurological - alert, oriented, normal speech, no focal findings or movement disorder noted  Musculoskeletal - no swelling noted  Extremities - normal movement    Labs:       No results found for this or any previous visit (from the past 1440 hour(s)). Assessment:     Morbid obesity with comorbidity    Plan:     laparoscopic sleeve gastrectomy    This is a 35 y.o. female with a BMI of Body mass index is 46.23 kg/m². and the weight-related co-morbidties as noted below. Emily Hansen meets the NIH criteria for bariatric surgery based upon the BMI of Body mass index is 46.23 kg/m². and multiple weight-related co-morbidties. Emily Hansen has elected laparoscopic sleeve gastrectomy as her intervention of choice for treatment of morbid obestiy through surgical means secondary to its safety profile, rapid return to work  and decreases in operative risks over gastric bypass. In the office today, following Alexandru's history and physical examination, a 30 minute discussion regarding the anatomic alterations for the laparoscopic sleeve gastrectomy was undertaken. The dietary expectations and the patient and physician dependent factors for success were thoroughly discussed, to include the need for interval follow-up and long-term dietary changes associated with success. The possible complications of the sleeve gastrectomy  were also discussed, to include;death, DVT/PE, staple line leak, bleeding, stricture formation, infection, nutritional deficiencies and sleeve dilation. Specific weight related outcomes for success were also discussed with an emphasis on careful and close follow-up with the first year and eating behavior modification as the baseline and cyclical hunger return. The patient expressed an understanding of the above factors, and her questions were answered in their entirety. In addition, the patient attended a 1.5 hour power point seminar regarding obesity, surgical weight loss including, adjustable gastric band, gastric bypass, and sleeve gastrectomy. This discussion contrasted the different surgical techniques, mechanisms of actions and expected outcomes, and surgical and medical risks associated with each procedure.   During this seminar, there was a long question and answer session where each questions was answered until there were no additional questions. Today, the patient had all of her questions answered and desires to proceed with  bariatric surgery initially choosing sleeve gastrectomy as her surgical option. Secondary Diagnoses:     Dietary Intervention  - The patient is currently scheduled to see or has been followed by a bariatric nutritionist for an attempt at preoperative weight loss as has been dictated by their insurance carrier. They will be assessed at various times during their follow up to evaluate their progress depending on the length of time that is required once again by their carrier. I have explained the importance of   preoperative weight loss and the benefits regarding lower surgical risk and also assisting the patient in reaching their weight loss goal. They understand also that they should participate in an  exercise program to assist in this weight loss. Finally they understand there is a physiologic benefit from the standpoint of hepatic volume reduction and reduction of central visceral adiposity   preoperatively. I have reiterated the importance of a low carbohydrate and high protein regimen to achieve their   stated goal. I have reviewed their current eating behavior prior to this encounter and explained to them in an exhaustive fashion the appropriate diet that they should adhere to. They have been   encouraged to loose weight pre operatively and understand it is our prerogative to cancel surgery or postpone their procedure in the event of significant weight gain. Smoking Cessation - Today I have counseled the patient extensively regarding smoking cessation for greater than 10 minutes. They have been counseled extensively about the detrimental effects of smoking on their weight loss surgical procedure particularly for the gastric bypass and sleeve gastrectomy procedures.   They understand that smoking leads to pulmonary issues postoperatively and can lead to gastric ulcers and marginal ulcers in the post bariatric surgery pouch that has been created. They understand that they must stop smoking 1 month at least prior to surgery or it may affect their ultimate progression to their procedure. They understand finally that labs may be obtained to prove that they have ceased smoking prior to surgery. Total time counseling was greater than 10 minutes. Hx of Anemia-the patient has had a history of iron deficiency anemia in the past which has been treated with iron therapy. She has not had a family physician in quite some time so therefore we will go ahead and draw her routine lab work which will include iron studies. If she is anemic we will place her on iron therapy. GERD -The patient understands that weight loss surgery is not a guaranteed cure for reflux disease but does understand the benefits that weight loss can have on reflux disease. They also understand that at the time of surgery the gastroesophageal junction will be evaluated for the presence of a diaphragmatic hernia. Hernias will be corrected always with the surgical procedure if possible and is deemed safe. The patient also understands that neither weight loss surgery nor repair of a diaphragmatic hernia repair guarantees the complete cessation of the disease. They also understand there is a possibility of recurrence of these hernias with a simple crural repair as is performed with these procedures. They understand they may have to continue their medications in the postoperative period. They have a good understanding that the gastric bypass procedure is better suited to total resolution of this issue and that neither the Lap Band nor sleeve gastrectomy is considered a curative procedure as it pertains to this diagnosis.     Signed By: Gerald Pires MD     June 22, 2020       This visit with Yordan Quintana was performed under virtual telemedicine guidelines during the coronavirus (CYXYJ-35) public health emergency on 6/22/2020 in an interactive   fashion using [employer] me. They understand that this telemedicine encounter is a billable service, with coverage determined by their insurance carrier. They are aware that   they may receive a bill and have provided verbal consent for this virtual visit. This visit was performed with the patient in their home environment and provider was   present at [facility]. I have spent over 60   minutes on this visit  both prior to the visits reviewing the patients chart and with the patient face to face. I have reviewed their medical history, performed a telemedicine physical examination, and discussed the plan of action to date. They understand that they will be asked   to come to the office when our office is allowed normal patient interaction, as dictated by public health officials, for a face-to-face visit to rediscuss all of the things we  have talked about today. During this visit we discussed the varieties of surgeries that we perform, how they would impact the patient from a weight loss standpoint   considering their medical issues and prior surgeries, and also the restrictions that the patient would have long-term with the operation that they have chosen.     Henok Chavez M.D.  6/22/2020

## 2020-06-23 ENCOUNTER — HOSPITAL ENCOUNTER (OUTPATIENT)
Dept: LAB | Age: 30
Discharge: HOME | End: 2020-06-23

## 2020-06-23 LAB — XX-LABCORP SPECIMEN COL,LCBCF: NORMAL

## 2020-06-23 PROCEDURE — 99001 SPECIMEN HANDLING PT-LAB: CPT

## 2020-06-25 LAB
ALBUMIN SERPL-MCNC: 4.3 G/DL (ref 3.9–5)
ALBUMIN/GLOB SERPL: 1.3 {RATIO} (ref 1.2–2.2)
ALP SERPL-CCNC: 94 IU/L (ref 39–117)
ALT SERPL-CCNC: 12 IU/L (ref 0–32)
AST SERPL-CCNC: 14 IU/L (ref 0–40)
BASOPHILS # BLD AUTO: 0 X10E3/UL (ref 0–0.2)
BASOPHILS NFR BLD AUTO: 0 %
BILIRUB SERPL-MCNC: 0.6 MG/DL (ref 0–1.2)
BUN SERPL-MCNC: 11 MG/DL (ref 6–20)
BUN/CREAT SERPL: 14 (ref 9–23)
CALCIUM SERPL-MCNC: 9.4 MG/DL (ref 8.7–10.2)
CHLORIDE SERPL-SCNC: 103 MMOL/L (ref 96–106)
CO2 SERPL-SCNC: 23 MMOL/L (ref 20–29)
CREAT SERPL-MCNC: 0.78 MG/DL (ref 0.57–1)
EOSINOPHIL # BLD AUTO: 0.2 X10E3/UL (ref 0–0.4)
EOSINOPHIL NFR BLD AUTO: 1 %
ERYTHROCYTE [DISTWIDTH] IN BLOOD BY AUTOMATED COUNT: 14.3 % (ref 11.7–15.4)
FERRITIN SERPL-MCNC: 30 NG/ML (ref 15–150)
GLOBULIN SER CALC-MCNC: 3.2 G/DL (ref 1.5–4.5)
GLUCOSE SERPL-MCNC: 84 MG/DL (ref 65–99)
H PYLORI IGA SER-ACNC: <9 UNITS (ref 0–8.9)
H PYLORI IGG SER IA-ACNC: 0.4 INDEX VALUE (ref 0–0.79)
H PYLORI IGM SER-ACNC: 10.1 UNITS (ref 0–8.9)
HCT VFR BLD AUTO: 39.1 % (ref 34–46.6)
HGB BLD-MCNC: 12.6 G/DL (ref 11.1–15.9)
IMM GRANULOCYTES # BLD AUTO: 0 X10E3/UL (ref 0–0.1)
IMM GRANULOCYTES NFR BLD AUTO: 0 %
IRON SERPL-MCNC: 33 UG/DL (ref 27–159)
LYMPHOCYTES # BLD AUTO: 4 X10E3/UL (ref 0.7–3.1)
LYMPHOCYTES NFR BLD AUTO: 33 %
MCH RBC QN AUTO: 27.8 PG (ref 26.6–33)
MCHC RBC AUTO-ENTMCNC: 32.2 G/DL (ref 31.5–35.7)
MCV RBC AUTO: 86 FL (ref 79–97)
MONOCYTES # BLD AUTO: 0.9 X10E3/UL (ref 0.1–0.9)
MONOCYTES NFR BLD AUTO: 7 %
NEUTROPHILS # BLD AUTO: 7.1 X10E3/UL (ref 1.4–7)
NEUTROPHILS NFR BLD AUTO: 59 %
PLATELET # BLD AUTO: 486 X10E3/UL (ref 150–450)
POTASSIUM SERPL-SCNC: 4 MMOL/L (ref 3.5–5.2)
PROT SERPL-MCNC: 7.5 G/DL (ref 6–8.5)
RBC # BLD AUTO: 4.53 X10E6/UL (ref 3.77–5.28)
SODIUM SERPL-SCNC: 138 MMOL/L (ref 134–144)
SPECIMEN STATUS REPORT, ROLRST: NORMAL
T4 FREE SERPL-MCNC: 1.2 NG/DL (ref 0.82–1.77)
TSH SERPL DL<=0.005 MIU/L-ACNC: 0.79 UIU/ML (ref 0.45–4.5)
WBC # BLD AUTO: 12.2 X10E3/UL (ref 3.4–10.8)

## 2020-06-29 ENCOUNTER — DOCUMENTATION ONLY (OUTPATIENT)
Dept: SURGERY | Age: 30
End: 2020-06-29

## 2020-06-29 DIAGNOSIS — A04.8 H. PYLORI INFECTION: Primary | ICD-10-CM

## 2020-06-29 RX ORDER — OMEPRAZOLE 20 MG/1
20 CAPSULE, DELAYED RELEASE ORAL
Qty: 28 CAP | Refills: 0 | Status: SHIPPED | OUTPATIENT
Start: 2020-06-29 | End: 2020-07-13

## 2020-06-29 RX ORDER — BISMUTH SUBCITRATE POTASSIUM, METRONIDAZOLE, TETRACYCLINE HYDROCHLORIDE 140; 125; 125 MG/1; MG/1; MG/1
3 CAPSULE ORAL 4 TIMES DAILY
Qty: 120 CAP | Refills: 0 | Status: SHIPPED | OUTPATIENT
Start: 2020-06-29 | End: 2020-07-09

## 2020-07-08 ENCOUNTER — APPOINTMENT (OUTPATIENT)
Dept: GENERAL RADIOLOGY | Age: 30
End: 2020-07-08
Attending: SPECIALIST
Payer: MEDICAID

## 2020-07-08 ENCOUNTER — HOSPITAL ENCOUNTER (OUTPATIENT)
Age: 30
Setting detail: OUTPATIENT SURGERY
Discharge: HOME | End: 2020-07-08
Attending: SPECIALIST | Admitting: SPECIALIST
Payer: MEDICAID

## 2020-07-08 VITALS
HEART RATE: 88 BPM | OXYGEN SATURATION: 98 % | RESPIRATION RATE: 16 BRPM | SYSTOLIC BLOOD PRESSURE: 143 MMHG | WEIGHT: 262 LBS | DIASTOLIC BLOOD PRESSURE: 89 MMHG | TEMPERATURE: 98 F | HEIGHT: 64 IN | BODY MASS INDEX: 44.73 KG/M2

## 2020-07-08 DIAGNOSIS — E66.01 MORBID OBESITY (HCC): ICD-10-CM

## 2020-07-08 PROCEDURE — 77030040361 HC SLV COMPR DVT MDII -B: Performed by: SPECIALIST

## 2020-07-08 PROCEDURE — 74240 X-RAY XM UPR GI TRC 1CNTRST: CPT

## 2020-07-08 PROCEDURE — 76040000019: Performed by: SPECIALIST

## 2020-07-08 PROCEDURE — 74011000255 HC RX REV CODE- 255: Performed by: SPECIALIST

## 2020-07-22 ENCOUNTER — CLINICAL SUPPORT (OUTPATIENT)
Dept: SURGERY | Age: 30
End: 2020-07-22

## 2020-07-22 VITALS — HEIGHT: 64 IN | WEIGHT: 262 LBS | BODY MASS INDEX: 44.73 KG/M2

## 2020-07-22 DIAGNOSIS — E66.01 MORBID OBESITY (HCC): Primary | ICD-10-CM

## 2020-07-22 NOTE — PROGRESS NOTES
Medical Weight Loss Multi-Disciplinary Program    Name: Yumiko Bernstein   : 1990    Session# 1  Date: 2020    Visit Vitals  Ht 5' 4\" (1.626 m)   Wt 118.8 kg (262 lb)   BMI 44.97 kg/m²     Behavior Modification    Patient is currently smoking - reports smoking 2-3 days per week    Dietitian: Sunita Zazueta is a 35 y.o. female who present for a pre-op evaluation. There were no vitals taken for this visit. Past Medical History:   Diagnosis Date    Anemia     Calculus of kidney     Depression     Former smoker     Morbid obesity ([facility] [address]     Morbid obesity with BMI of 45.0-49.9, adult ([facility] [address]     Suicidal thoughts            Procedure:  laparoscopic sleeve gastrectomy     Reasons for Surgery:  BMI > 40 with one or more medically significant comorbidities    Summary:  Pt given brief pre/post-op diet ed and diet hx reviewed. Pt instructed to follow a low calorie, low carbohydrate, high protein diet of about 2538-2176 calories daily. Pt set several goals. See below. What have you done in the past to try to lose weight? Calorie controlled, exercise programs, Ketogenic diet, low sugar diet, [employer]     Why didn't you lose weight or keep the weight off?: Patient has not been able to manage weight over the past 3-4 years    Why do you think having weight loss surgery will make it possible for you to lose weight and keep it off? Patient wants to improve total health and lose weight and keep it off    Dietary Instructions    Nutritional Hx: What is the number of meals you eat per day? 2  Comment: Skips breakfast     Do you eat between meals / snack? yes  Typical snack:  Bag of chips, kit pradeep, 20 oz pepsi     How fast do you eat your meals? rapid    How often do you eat fast food? 2 times a week    How many sodas/sugared beverages do you drink per day? Soda 2-3 cups per day     How many caffeinated drinks do you have per day?  Soda, caffeine     How much milk and/or juice do you drink per day? Juice 4-5 days per week     How much water do you drink per day? 4 (8oz glasses)    How often do you consume alcohol? None - patient denies alcohol consumption     Current Vitamins: none     Diet History:    Typical intake is as follows: Performed 24 hour recall  Breakfast: SKIPS - wake up 5-7 am, does not eat until 11 am - drinks coffee- sugar sweetened creamer 2-3 Tbsp of creamer OR Orange juice   Lunch: 11 am- 2 slices of toast with 1 slice of carlin and coffee with sugar sweetened creamer  Snack: Bag of chips, kit pradeep, 20 oz pepsi   Dinner: 10:00 pm - Banana pudding -- reports a typical dinner would be Baked chicken (breast, wings ) OR beef (sirloin) - with corn on the cob and green beans OR Beef with potato salad and deviled eggs - frequently eats rice with evening meal    Fluids: Water (2 - 16.9 oz bottles per day), Constantino Pepsi 20-30 oz per day), Orange juice     Reviewed intake  Understanding low carbohydrates, low sugar, higher protein meals  Understanding proper portions  Dining outside home  Instruction given for personal dietary changes  Discussed perceived compliance  Comments: Pt given brief pre/post-op diet ed and diet hx reviewed. Patient Education and Materials Provided:  Supplement [facility], B Vitamin Information, MVI Recommendations, Calcium Citrate Information, Bariatric Supplement Companies, Protein Supplement Information, Fluid Requirements, No Caffeine or Carbonation, No Alcohol for One Year Post Op, 3 Balanced Meals a Day, Food Group Guide, Good Choices Dining Out, No Snacks, No Concentrated Sweets, Support System at Home, Exercising, Support Group Information and Addressed Current Habits / Changes to make  Physical Activity/Exercise    Discussed Perceived Compliance  Reasonable Goals Set  Motivation  Comments: none    Exercise:  Do you currently have an exercise routine? no    Goals:   1. Work to increase to 3-4 small meals per day, with planned snacks as needed.   Recommend following plate method for meal planning - focusing on lean protein, non-starchy vegetables, and measured amounts of starch. - Goal of  g protein and  g carbohydrate per day. - Recommend continuing protein supplement as meal replacement at least 1x/day  2. Increase non caloric fluid to 64 oz per day. Eliminate caffeine, added sugar, carbonation, and straws.               -Continue to work to decrease sugar sweetened beverages - goal of calorie free beverages only              -Must eliminate caffeine prior to surgery and avoid for ~6-8 weeks  3. Start activity regimen, work to increase ADL              -Try to start walking for at least 30-60  minutes 3-5 days per week  4. Start Complete MVI    Candidate for surgery (per RD): PENDING   Vignesh Galvez RD  07/22/20

## 2020-08-18 ENCOUNTER — CLINICAL SUPPORT (OUTPATIENT)
Dept: SURGERY | Age: 30
End: 2020-08-18

## 2020-08-18 VITALS — BODY MASS INDEX: 44.39 KG/M2 | WEIGHT: 260 LBS | HEIGHT: 64 IN

## 2020-08-18 DIAGNOSIS — E66.01 MORBID OBESITY (HCC): Primary | ICD-10-CM

## 2020-08-18 NOTE — PROGRESS NOTES
Medical Weight Loss Multi-Disciplinary Program    Name: Tracee Starks   : 1990    Session# 2  Date: 2020    Visit Vitals  Ht 5' 4\" (1.626 m)   Wt 117.9 kg (260 lb)   BMI 44.63 kg/m²     Dietary Instructions    Reviewed intake  Understanding low carbohydrates, low sugar, higher protein meals  Instruction given for personal dietary changes  Discussed perceived compliance  Comments: Diet hx reviewed and personal dietary changes discussed. Reviewed recommendation to follow 2092-6973 calorie diet, working to reduce total carbohydrate intake to  g or less per day and increasing protein intake to  g per day, compared current intake to recommendations. Patient has been working to modify meal timing, eating earlier in the morning and working to decrease late night snacking, and sugar sweetened beverages. Today the majority of our visit was spent reviewing patient's food recall and identifying areas for improvement. Educated  on the importance of eating 3 meals/day at regularly scheduled times including breakfast within 1st 1-2 hours of waking. Suggested patient use a protein supplement as a meal replacement instead of skipping OR as a between meal high protein snack. Also educated on the importance of including a protein with every meal and snack and reviewed lean sources. Lastly introduced  the Plate Method for Meal Planning and used food models to assist with visualizing recommended serving sizes.       Typical intake is as follows:  Breakfast: Oatmeal with fruit-  drinks coffee- sugar sweetened creamer 2-3 Tbsp of creamer OR Orange juice   Lunch: Frequently skipping due to work schedule, snacking on fruit - apple or occasionally   Dinner: 10:00 pm - Banana pudding -- reports a typical dinner would be Baked chicken (breast, wings ) OR beef (sirloin) - with corn on the cob and green beans OR Beef with potato salad and deviled eggs - frequently eats rice with evening meal    Fluids: Water (2 - 16.9 oz bottles per day), Constantino Pepsi 20-30 oz per day), Orange juice       Nutritional Hx: What is the number of meals you eat per day? 2  Comment: Skips lunch    Do you eat between meals / snack? yes  Typical snack:  Bag of chips, fruit     How fast do you eat your meals? rapid    How often do you eat fast food? 2-3 times a week    How many sodas/sugared beverages do you drink per day? Soda 2-3 cups per day     How many caffeinated drinks do you have per day? Soda, caffeine     How much milk and/or juice do you drink per day? Juice 4-5 days per week     How much water do you drink per day? 4 (8oz glasses)    How often do you consume alcohol? None - patient denies alcohol consumption     Current Vitamins: none     Physical Activity/Exercise    Discussed Perceived Compliance  Motivation    Patient has not started physical activity regimen, reinforced the importance of regular physical activity for total health and weight management. Behavior Modification    Identify obstacles to trigger change  Achieving/Rewarding goals met  Positive attitude  Comments: Reinforced importance continuing to modify lifestyle patterns and behaviors to promote weight loss and long term weight maintenance       Goals:   1. Work to increase to 3-4 small meals per day, with planned snacks as needed. Recommend following plate method for meal planning - focusing on lean protein, non-starchy vegetables, and measured amounts of starch. - Goal of  g protein and  g carbohydrate per day. - Recommend continuing protein supplement as meal replacement at least 1x/day OR as high protein snack option  2. Increase non caloric fluid to 64 oz per day.   Eliminate caffeine, added sugar, carbonation, and straws.               -Continue to work to decrease sugar sweetened beverages - goal of calorie free beverages only              -Must eliminate caffeine prior to surgery and avoid for ~6-8 weeks   -Practice 30:30 rule,  food and flood   3. Start activity regimen, work to increase ADL  4. Start Complete MVI    Candidate for surgery (per RD):  PENDING    Rudy Valdez    Dietitian: Rudy Valdez RD

## 2020-08-26 ENCOUNTER — VIRTUAL VISIT (OUTPATIENT)
Dept: SURGERY | Age: 30
End: 2020-08-26

## 2020-08-26 VITALS — BODY MASS INDEX: 45.07 KG/M2 | WEIGHT: 264 LBS | HEIGHT: 64 IN

## 2020-08-26 DIAGNOSIS — E66.01 MORBID OBESITY WITH BMI OF 45.0-49.9, ADULT (HCC): ICD-10-CM

## 2020-08-26 DIAGNOSIS — D50.9 IRON DEFICIENCY ANEMIA, UNSPECIFIED IRON DEFICIENCY ANEMIA TYPE: ICD-10-CM

## 2020-08-26 DIAGNOSIS — E66.01 MORBID OBESITY (HCC): Primary | Chronic | ICD-10-CM

## 2020-08-26 DIAGNOSIS — N20.0 CALCULUS OF KIDNEY: ICD-10-CM

## 2020-08-26 DIAGNOSIS — Z87.891 FORMER SMOKER: ICD-10-CM

## 2020-08-26 DIAGNOSIS — F32.A DEPRESSION, UNSPECIFIED DEPRESSION TYPE: ICD-10-CM

## 2020-08-31 NOTE — PROGRESS NOTES
Bariatric Surgery Preoperative Progress Note    Subjective:     Dov Tejada is a 35 y.o. obese female with a Body mass index is 45.32 kg/m². Anupama Abdullahi she desires surgery at this time because of health issues and quality of life issues. Dov Tejada has been seen by a bariatric nutritionist and has been placed on an appropriate low carbohydrate diet. The patient desires laparoscopic sleeve gastrectomy for surgical weight loss, however she is here today to review their workup to date. Dov Tejada is here also today to check progress with weight loss / evaluate nutritional status and review all subspecialty clearances in hopes of proceeding to the operating room. Patient Active Problem List    Diagnosis Date Noted    Anemia     Former smoker     Calculus of kidney     Morbid obesity with BMI of 45.0-49.9, adult ([facility] [address]     Depression 12/16/2015    Morbid obesity ([facility] [address] 04/16/2013      Past Surgical History:   Procedure Laterality Date    CYSTOSCOPY      stone extraction    HX CHOLECYSTECTOMY        Social History     Tobacco Use    Smoking status: Former Smoker     Packs/day: 0.25     Last attempt to quit: 6/22/2017     Years since quitting: 3.1    Smokeless tobacco: Never Used   Substance Use Topics    Alcohol use: No     Alcohol/week: 1.7 standard drinks     Types: 2 Cans of beer per week     Comment: social pt reports seldom use can not recall last use      Family History   Problem Relation Age of Onset    Obesity Father       Current Outpatient Medications   Medication Sig Dispense Refill    HYDROcodone-acetaminophen (NORCO) 5-325 mg per tablet Take 1 Tab by mouth every four (4) hours as needed for Pain. Max Daily Amount: 6 Tabs. 8 Tab 0    ibuprofen (MOTRIN) 600 mg tablet Take 1 Tab by mouth every six (6) hours as needed for Pain.  20 Tab 0     No Known Allergies       Review of Systems:            General - No history or complaints of unexpected fever, chills, or weight loss  Head/Neck - No history or complaints of headache, diplopia, dysphagia, hearing loss  Cardiac - No history or complaints of chest pain, palpitations, murmur, or shortness of breath  Pulmonary - No history or complaints of shortness of breath, productive cough, hemoptysis  Gastrointestinal - No history or complaints of reflux,  abdominal pain, obstipation/constipation, blood per rectum  Genitourinary - No history or complaints of hematuria/dysuria, stress urinary incontinence symptoms, or renal lithiasis  Musculoskeletal - No history or complaints of joint pain or muscular weakness  Hematologic - No history or complaints of bleeding disorders, blood transfusions, sickle cell anemia  Neurologic - No history or complaints of  migraine headaches, seizure activity, syncopal episodes, TIA or stroke  Integumentary - No history or complaints of rashes, abnormal nevi, skin cancer  Gynecological - no change           Objective:     Visit Vitals  Ht 5' 4\" (1.626 m)   Wt 119.7 kg (264 lb)   BMI 45.32 kg/m²       Physical Examination:      Physical Examination: General appearance - alert, well appearing, and in no distress and oriented to person, place, and time  Mental status - alert, oriented to person, place, and time, normal mood, behavior, speech, dress, motor activity, and thought processes  Eyes - pupils equal and reactive, extraocular eye movements intact, sclera anicteric, left eye normal, right eye normal  Ears - right ear normal, left ear normal  Neck- good extension and flexion, no obvious swelling  Chest - good air movement  Heart - N/A  Abdomen - no obvious distension.   Neurological - alert, oriented, normal speech, no focal findings or movement disorder noted  Musculoskeletal - no swelling noted  Extremities - normal movement    Labs:     Recent Results (from the past 2016 hour(s))   CBC WITH AUTOMATED DIFF    Collection Time: 06/23/20 12:00 AM   Result Value Ref Range    WBC 12.2 (H) 3.4 - 10.8 x10E3/uL    RBC 4.53 3.77 - 5.28 x10E6/uL HGB 12.6 11.1 - 15.9 g/dL    HCT 39.1 34.0 - 46.6 %    MCV 86 79 - 97 fL    MCH 27.8 26.6 - 33.0 pg    MCHC 32.2 31.5 - 35.7 g/dL    RDW 14.3 11.7 - 15.4 %    PLATELET 941 (H) 013 - 450 x10E3/uL    NEUTROPHILS 59 Not Estab. %    Lymphocytes 33 Not Estab. %    MONOCYTES 7 Not Estab. %    EOSINOPHILS 1 Not Estab. %    BASOPHILS 0 Not Estab. %    ABS. NEUTROPHILS 7.1 (H) 1.4 - 7.0 x10E3/uL    Abs Lymphocytes 4.0 (H) 0.7 - 3.1 x10E3/uL    ABS. MONOCYTES 0.9 0.1 - 0.9 x10E3/uL    ABS. EOSINOPHILS 0.2 0.0 - 0.4 x10E3/uL    ABS. BASOPHILS 0.0 0.0 - 0.2 x10E3/uL    IMMATURE GRANULOCYTES 0 Not Estab. %    ABS. IMM. GRANS. 0.0 0.0 - 0.1 [id number]/WM   METABOLIC PANEL, COMPREHENSIVE    Collection Time: 06/23/20 12:00 AM   Result Value Ref Range    Glucose 84 65 - 99 mg/dL    BUN 11 6 - 20 mg/dL    Creatinine 0.78 0.57 - 1.00 mg/dL    GFR est non- >59 mL/min/1.73    GFR est  >59 mL/min/1.73    BUN/Creatinine ratio 14 9 - 23    Sodium 138 134 - 144 mmol/L    Potassium 4.0 3.5 - 5.2 mmol/L    Chloride 103 96 - 106 mmol/L    CO2 23 20 - 29 mmol/L    Calcium 9.4 8.7 - 10.2 mg/dL    Protein, total 7.5 6.0 - 8.5 g/dL    Albumin 4.3 3.9 - 5.0 g/dL    GLOBULIN, TOTAL 3.2 1.5 - 4.5 g/dL    A-G Ratio 1.3 1.2 - 2.2    Bilirubin, total 0.6 0.0 - 1.2 mg/dL    Alk. phosphatase 94 39 - 117 IU/L    AST (SGOT) 14 0 - 40 IU/L    ALT (SGPT) 12 0 - 32 IU/L   H. PYLORI ABS, IGG, IGA, IGM    Collection Time: 06/23/20 12:00 AM   Result Value Ref Range    H pylori Ab IgG 0.40 0.00 - 0.79 Index Value    H. pylori Ab, IgA <9.0 0.0 - 8.9 units    H. Pylori Ab,IgM 10.1 (H) 0.0 - 8.9 units   TSH 3RD GENERATION    Collection Time: 06/23/20 12:00 AM   Result Value Ref Range    TSH 0.788 0.450 - 4.500 uIU/mL   T4, FREE    Collection Time: 06/23/20 12:00 AM   Result Value Ref Range    T4, Free 1.20 0.82 - 1.77 ng/dL   IRON    Collection Time: 06/23/20 12:00 AM   Result Value Ref Range    Iron 33 27 - 159 ug/dL   FERRITIN    Collection Time: 06/23/20 12:00 AM   Result Value Ref Range    Ferritin 30 15 - 150 ng/mL   SPECIMEN STATUS REPORT    Collection Time: 06/23/20 12:00 AM   Result Value Ref Range    SPECIMEN STATUS REPORT COMMENT    [facility] SPECIMEN COL    Collection Time: 06/23/20  6:05 PM   Result Value Ref Range    [facility]. Specimens collected/sent to [facility]. Please direct inquiries to ([phone]). Assessment:     Morbid obesity with associated comorbidity     Plan:     Continuation of Pre-Operative evaluation / clearance. George Purdy has returned to the office today to discuss her status as a surgical candidate.    her progress has been noted and reviewed. We will continue the pre-operative process and work towards goals as outlined. she has 0 more pounds to lose before proceeding to the OR.  (0 pounds lost since last visit)  she has 4 more nutritional visits to complete before proceeding to the OR  she has no clearance to review before proceeding to the OR. George Purdy understand the rationales for all the above. It has been discussed that given her   condition   that the best surgical option for this patient would be the laparoscopic sleeve gastrectomy. George Purdy   agrees with the surgical choice and has been educated in it's; risks, benefits, and alternatives. We will continue   with the pre-operative evaluation as needed to check progress. Secondary Diagnoses:         Signed By: Vince Alex MD     August 31, 2020         This visit with George Purdy was performed under virtual telemedicine guidelines during the coronavirus (GSABY-54) public health emergency on 8/26/2020 in an interactive   fashion using [employer]. They understand that this telemedicine encounter is a billable service, with coverage determined by their insurance carrier. They are aware that   they may receive a bill and have provided verbal consent for this virtual visit.  This visit was performed with the patient in their home environment and provider was   present at [facility]. I have spent over 30 minutes on this visit  both prior to the visits reviewing the patients chart and with the patient face to face. I have reviewed their medical history, performed a telemedicine physical examination, and discussed the plan of action to date. They understand that they will be asked   to come to the office when our office is allowed normal patient interaction, as dictated by public health officials, for a face-to-face visit to rediscuss all of the things we  have talked about today. During this visit we discussed the varieties of surgeries that we perform, how they would impact the patient from a weight loss standpoint   considering their medical issues and prior surgeries, and also the restrictions that the patient would have long-term with the operation that they have chosen.     Winston Cameron M.D.  8/31/2020

## 2020-08-31 NOTE — PATIENT INSTRUCTIONS
Patient Instructions 1. Continue to monitor carbohydrate and protein intake- remember to keep your           total  carbohydrates to 50 grams or less per day for best results. 2. Remember hydration goals - usually 48 to 64 ounces of liquids per day 3. Continue to work towards exercise goals - minimum 3 days per week of 45          minutes to  1 hour at a time. 4. Remember to take vitamins as directed Supplement Resource Guide Importance of Protein:  
Maintains lean body mass, produces antibodies to fight off infections, heals wounds, minimizes hair loss, helps to give you energy, helps with satiety, and keeping you full between meals. Importance of Calcium: 
Needed for healthy bones and teeth, normal blood clotting, and nervous system functioning, higher risk of osteoporosis and bone disease with non-compliance. Importance of Multivitamins: Many functions. Supply you with extra nutrients that you may be missing from food. May lead to iron deficiency anemia, weakness, fatigue, and many other symptoms with non-compliance. Importance of B Vitamins: 
Important for red blood cell formation, metabolism, energy, and helps to maintain a healthy nervous system. Protein Supplement Find one you like now. Use immediately after surgery. Look for: 
35-50g protein each day from your protein supplement once you reach the progression diet. 0-3 g fat per serving 0-3 g sugar per serving Protein drinks should be split in separate dosages. Recommend: Lifelong 1 year + Calcium Supplement:  
 
Start taking within a month after surgery. Look for: Calcium Citrate Plus D (1500 mg per day) Recommend: Citracal 
 
 . Avoid chocolate chewable calcium. Can use chewable bariatric or [employer] brand or similar chewable. The body cannot absorb more than 500-600 mg @ a time. Take for Life Multi-vitamin Supplement: 1st Month After Surgery: Any complete chewable, such as: [place]s Complete chewables. Avoid [place] sours or gummies. They lack iron and other important nutrients and also have added sugar. Continue with chewable vitamin or change to adult complete multivitamin one month after surgery. Menstruating women can take a prenatal vitamin. Make sure has at least 18 mg iron and 513-146 mcg folic acid): Vitamin B12, B Complex Vitamin, and Biotin Start taking within a month after surgery. Vitamin B12:  1000 mcg of Vitamin B12 three times weekly Must take sublingually (meaning you take it under your tongue) or in a liquid drop form for easy absorption. B Complex Vitamin: Take a pill or liquid drop form once daily. Biotin: This vitamin can help prevent hair loss. Recommend 5mg  
(5000 mcg) a day Biotin is Optional

## 2020-10-17 ENCOUNTER — HOSPITAL ENCOUNTER (EMERGENCY)
Age: 30
Discharge: HOME | End: 2020-10-17
Attending: EMERGENCY MEDICINE
Payer: MEDICAID

## 2020-10-17 VITALS
RESPIRATION RATE: 18 BRPM | BODY MASS INDEX: 45.07 KG/M2 | OXYGEN SATURATION: 100 % | TEMPERATURE: 97.5 F | HEART RATE: 86 BPM | HEIGHT: 64 IN | DIASTOLIC BLOOD PRESSURE: 92 MMHG | WEIGHT: 264 LBS | SYSTOLIC BLOOD PRESSURE: 136 MMHG

## 2020-10-17 DIAGNOSIS — H60.501 ACUTE OTITIS EXTERNA OF RIGHT EAR, UNSPECIFIED TYPE: Primary | ICD-10-CM

## 2020-10-17 PROCEDURE — 99282 EMERGENCY DEPT VISIT SF MDM: CPT

## 2020-10-17 RX ORDER — NEOMYCIN SULFATE, POLYMYXIN B SULFATE AND HYDROCORTISONE 10; 3.5; 1 MG/ML; MG/ML; [USP'U]/ML
3 SUSPENSION/ DROPS AURICULAR (OTIC) 4 TIMES DAILY
Qty: 10 ML | Refills: 0 | OUTPATIENT
Start: 2020-10-17 | End: 2022-01-02

## 2020-10-17 NOTE — ED TRIAGE NOTES
Pt states \" I have a runny nose off and on and earache on the right side and sore throat. I want to BE checked for Covid. \"

## 2020-10-17 NOTE — DISCHARGE INSTRUCTIONS
Patient Education        Swimmer's Ear: Care Instructions  Your Care Instructions     Swimmer's ear (otitis externa) is inflammation or infection of the ear canal. This is the passage that leads from the outer ear to the eardrum. Any water, sand, or other debris that gets into the ear canal and stays there can cause swimmer's ear. Putting cotton swabs or other items in the ear to clean it can also cause this problem. Swimmer's ear can be very painful. But you can treat the pain and infection with medicines. You should feel better in a few days. Follow-up care is a key part of your treatment and safety. Be sure to make and go to all appointments, and call your doctor if you are having problems. It's also a good idea to know your test results and keep a list of the medicines you take. How can you care for yourself at home? Cleaning and care  · Use antibiotic drops as your doctor directs. · Do not insert ear drops (other than the antibiotic ear drops) or anything else into the ear unless your doctor has told you to. · Avoid getting water in the ear until the problem clears up. Use cotton lightly coated with petroleum jelly as an earplug. Do not use plastic earplugs. · Use a hair dryer set on low to carefully dry the ear after you shower. · To ease ear pain, hold a warm washcloth against your ear. · Take pain medicines exactly as directed. ? If the doctor gave you a prescription medicine for pain, take it as prescribed. ? If you are not taking a prescription pain medicine, ask your doctor if you can take an over-the-counter medicine. Inserting ear drops  · Warm the drops to body temperature by rolling the container in your hands. Or you can place it in a cup of warm water for a few minutes. · Lie down, with your ear facing up. · Place drops inside the ear. Follow your doctor's instructions (or the directions on the label) for how many drops to use.  Gently wiggle the outer ear or pull the ear up and back to help the drops get into the ear. · It's important to keep the liquid in the ear canal for 3 to 5 minutes. When should you call for help? Call your doctor now or seek immediate medical care if:    · You have a new or higher fever.     · You have new or worse pain, swelling, warmth, or redness around or behind your ear.     · You have new or increasing pus or blood draining from your ear. Watch closely for changes in your health, and be sure to contact your doctor if:    · You are not getting better after 2 days (48 hours). Where can you learn more? Go to http://www.gray.com/  Enter C706 in the search box to learn more about \"Swimmer's Ear: Care Instructions. \"  Current as of: April 15, 2020               Content Version: 12.6  © 8782-6815 [employer]. Care instructions adapted under license by [employer] (which disclaims liability or warranty for this information). If you have questions about a medical condition or this instruction, always ask your healthcare professional. [address] any warranty or liability for your use of this information.

## 2020-10-17 NOTE — ED PROVIDER NOTES
EMERGENCY DEPARTMENT HISTORY AND PHYSICAL EXAM    Date: 10/17/2020  Patient Name: Angel Louis    History of Presenting Illness     Chief Complaint   Patient presents with    Ear Pain         History Provided By: Patient    Angel Louis is a 35 y.o. female with no PMHX who presents to the emergency department C/O right ear pain. Associated sxs include minimal runny nose. Patient reports 2 days of right ear pain pt denies cough, fever, ear drainage and any other sxs or complaints. PCP: None    Current Outpatient Medications   Medication Sig Dispense Refill    neomycin-polymyxin-hydrocortisone, buffered, (PEDIOTIC) 3.5-10,000-1 mg/mL-unit/mL-% otic suspension Administer 3 Drops in right ear four (4) times daily. 10 mL 0    HYDROcodone-acetaminophen (NORCO) 5-325 mg per tablet Take 1 Tab by mouth every four (4) hours as needed for Pain. Max Daily Amount: 6 Tabs. 8 Tab 0    ibuprofen (MOTRIN) 600 mg tablet Take 1 Tab by mouth every six (6) hours as needed for Pain.  20 Tab 0       Past History     Past Medical History:  Past Medical History:   Diagnosis Date    Anemia     Calculus of kidney     Depression     Former smoker     Morbid obesity ([address]     Morbid obesity with BMI of 45.0-49.9, adult [facility] [address]     Suicidal thoughts        Past Surgical History:  Past Surgical History:   Procedure Laterality Date    CYSTOSCOPY      stone extraction    HX CHOLECYSTECTOMY         Family History:  Family History   Problem Relation Age of Onset    Obesity Father        Social History:  Social History     Tobacco Use    Smoking status: Former Smoker     Packs/day: 0.25     Last attempt to quit: 6/22/2017     Years since quitting: 3.3    Smokeless tobacco: Never Used   Substance Use Topics    Alcohol use: No     Alcohol/week: 1.7 standard drinks     Types: 2 Cans of beer per week     Comment: social pt reports seldom use can not recall last use    Drug use: Not Currently     Types: Marijuana     Comment: \" I only used it Sunday one time denies active user       Allergies:  No Known Allergies      Review of Systems   Review of Systems   Constitutional: Negative for fever. HENT: Positive for ear pain and rhinorrhea. Negative for congestion, ear discharge, postnasal drip and sore throat. Respiratory: Negative for cough and shortness of breath. All other systems reviewed and are negative. Physical Exam     Vitals:    10/17/20 1026   BP: (!) 136/92   Pulse: 86   Resp: 18   Temp: 97.5 °F (36.4 °C)   SpO2: 100%   Weight: 119.7 kg (264 lb)   Height: 5' 4\" (1.626 m)     Physical Exam  Vitals signs and nursing note reviewed. Constitutional:       General: She is not in acute distress. Appearance: Normal appearance. She is obese. She is not ill-appearing. HENT:      Head: Normocephalic and atraumatic. Right Ear: Hearing normal. Swelling and tenderness present. No middle ear effusion. Tympanic membrane is not injected or erythematous. Left Ear: Hearing, tympanic membrane, ear canal and external ear normal.      Ears:      Comments: Swelling to right EAC with some whitish exudate noted; painful with manipulation of tragus and pinna     Nose: Nose normal.      Mouth/Throat:      Mouth: Mucous membranes are moist.      Pharynx: No oropharyngeal exudate or posterior oropharyngeal erythema. Eyes:      Extraocular Movements: Extraocular movements intact. Conjunctiva/sclera: Conjunctivae normal.      Pupils: Pupils are equal, round, and reactive to light. Neck:      Musculoskeletal: Normal range of motion and neck supple. Musculoskeletal: Normal range of motion. Skin:     General: Skin is warm and dry. Capillary Refill: Capillary refill takes less than 2 seconds. Neurological:      General: No focal deficit present. Mental Status: She is alert and oriented to person, place, and time.    Psychiatric:         Mood and Affect: Mood normal.         Behavior: Behavior normal. Diagnostic Study Results     Labs -   No results found for this or any previous visit (from the past 12 hour(s)). Radiologic Studies -   No orders to display     CT Results  (Last 48 hours)    None        CXR Results  (Last 48 hours)    None          Medications given in the ED-  Medications - No data to display      Medical Decision Making   I am the first provider for this patient. I reviewed the vital signs, available nursing notes, past medical history, past surgical history, family history and social history. Vital Signs-Reviewed the patient's vital signs. Pulse Oximetry Analysis - 100% on RA     Records Reviewed: Nursing Notes    Procedures:  Procedures    ED Course:   10:47 AM   Initial assessment performed. The patients presenting problems have been discussed, and they are in agreement with the care plan formulated and outlined with them. I have encouraged them to ask questions as they arise throughout their visit. Discussion: 35 y.o. female uncomplicated otitis externa right ear. Afebrile appropriate vital signs. Plan for antibiotic drops and PCP follow-up. Return precautions discussed. Diagnosis and Disposition       DISCHARGE NOTE:  Pau Bartlett  results have been reviewed with her. She has been counseled regarding her diagnosis, treatment, and plan. She verbally conveys understanding and agreement of the signs, symptoms, diagnosis, treatment and prognosis and additionally agrees to follow up as discussed. She also agrees with the care-plan and conveys that all of her questions have been answered. I have also provided discharge instructions for her that include: educational information regarding their diagnosis and treatment, and list of reasons why they would want to return to the ED prior to their follow-up appointment, should her condition change. She has been provided with education for proper emergency department utilization. CLINICAL IMPRESSION:    1. Acute otitis externa of right ear, unspecified type        PLAN:  1. D/C Home  2. Current Discharge Medication List      START taking these medications    Details   neomycin-polymyxin-hydrocortisone, buffered, (PEDIOTIC) 3.5-10,000-1 mg/mL-unit/mL-% otic suspension Administer 3 Drops in right ear four (4) times daily. Qty: 10 mL, Refills: 0           3. Follow-up Information     Follow up With Specialties Details Why Contact Info    your PCP  Schedule an appointment as soon as possible for a visit      [facility] EMERGENCY DEPT Emergency Medicine  As needed, If symptoms worsen [address] [address]  [phone]    [id number] [place] [address]   for primary care follow up [address] [facility], [address] [place] [address]  [phone]                  Please note that this dictation was completed with [employer], the computer voice recognition software. Quite often unanticipated grammatical, syntax, homophones, and other interpretive errors are inadvertently transcribed by the computer software. Please disregard these errors. Please excuse any errors that have escaped final proofreading.

## 2020-12-20 ENCOUNTER — HOSPITAL ENCOUNTER (EMERGENCY)
Age: 30
Discharge: HOME | End: 2020-12-20
Attending: EMERGENCY MEDICINE
Payer: MEDICAID

## 2020-12-20 VITALS
OXYGEN SATURATION: 100 % | BODY MASS INDEX: 40.51 KG/M2 | HEART RATE: 103 BPM | DIASTOLIC BLOOD PRESSURE: 108 MMHG | RESPIRATION RATE: 18 BRPM | WEIGHT: 236 LBS | SYSTOLIC BLOOD PRESSURE: 149 MMHG | TEMPERATURE: 97.8 F

## 2020-12-20 DIAGNOSIS — R11.2 NON-INTRACTABLE VOMITING WITH NAUSEA, UNSPECIFIED VOMITING TYPE: Primary | ICD-10-CM

## 2020-12-20 DIAGNOSIS — Z33.1 PREGNANCY TEST POSITIVE FOR INCIDENTAL PREGNANCY: ICD-10-CM

## 2020-12-20 LAB
ALBUMIN SERPL-MCNC: 3.7 G/DL (ref 3.4–5)
ALBUMIN/GLOB SERPL: 0.9 {RATIO} (ref 0.8–1.7)
ALP SERPL-CCNC: 88 U/L (ref 45–117)
ALT SERPL-CCNC: 67 U/L (ref 13–56)
ANION GAP SERPL CALC-SCNC: 7 MMOL/L (ref 3–18)
APPEARANCE UR: ABNORMAL
AST SERPL-CCNC: 24 U/L (ref 10–38)
BACTERIA URNS QL MICRO: ABNORMAL /HPF
BASOPHILS # BLD: 0 K/UL (ref 0–0.1)
BASOPHILS NFR BLD: 0 % (ref 0–2)
BILIRUB SERPL-MCNC: 0.8 MG/DL (ref 0.2–1)
BILIRUB UR QL: NEGATIVE
BUN SERPL-MCNC: 5 MG/DL (ref 7–18)
BUN/CREAT SERPL: 8 (ref 12–20)
CALCIUM SERPL-MCNC: 9.4 MG/DL (ref 8.5–10.1)
CHLORIDE SERPL-SCNC: 103 MMOL/L (ref 100–111)
CO2 SERPL-SCNC: 25 MMOL/L (ref 21–32)
COLOR UR: YELLOW
CREAT SERPL-MCNC: 0.66 MG/DL (ref 0.6–1.3)
DIFFERENTIAL METHOD BLD: ABNORMAL
EOSINOPHIL # BLD: 0.1 K/UL (ref 0–0.4)
EOSINOPHIL NFR BLD: 1 % (ref 0–5)
EPITH CASTS URNS QL MICRO: ABNORMAL /LPF (ref 0–5)
ERYTHROCYTE [DISTWIDTH] IN BLOOD BY AUTOMATED COUNT: 14.3 % (ref 11.6–14.5)
GLOBULIN SER CALC-MCNC: 4.3 G/DL (ref 2–4)
GLUCOSE SERPL-MCNC: 98 MG/DL (ref 74–99)
GLUCOSE UR STRIP.AUTO-MCNC: NEGATIVE MG/DL
HCG SERPL-ACNC: ABNORMAL MIU/ML (ref 0–10)
HCG UR QL: POSITIVE
HCT VFR BLD AUTO: 34.6 % (ref 35–45)
HGB BLD-MCNC: 12.3 G/DL (ref 12–16)
HGB UR QL STRIP: ABNORMAL
KETONES UR QL STRIP.AUTO: 40 MG/DL
LEUKOCYTE ESTERASE UR QL STRIP.AUTO: ABNORMAL
LYMPHOCYTES # BLD: 2.8 K/UL (ref 0.9–3.6)
LYMPHOCYTES NFR BLD: 22 % (ref 21–52)
MCH RBC QN AUTO: 30.2 PG (ref 24–34)
MCHC RBC AUTO-ENTMCNC: 35.5 G/DL (ref 31–37)
MCV RBC AUTO: 85 FL (ref 74–97)
MONOCYTES # BLD: 0.8 K/UL (ref 0.05–1.2)
MONOCYTES NFR BLD: 6 % (ref 3–10)
MUCOUS THREADS URNS QL MICRO: ABNORMAL /LPF
NEUTS SEG # BLD: 9 K/UL (ref 1.8–8)
NEUTS SEG NFR BLD: 71 % (ref 40–73)
NITRITE UR QL STRIP.AUTO: NEGATIVE
PH UR STRIP: 7 [PH] (ref 5–8)
PLATELET # BLD AUTO: 472 K/UL (ref 135–420)
PMV BLD AUTO: 8 FL (ref 9.2–11.8)
POTASSIUM SERPL-SCNC: 3.7 MMOL/L (ref 3.5–5.5)
PROT SERPL-MCNC: 8 G/DL (ref 6.4–8.2)
PROT UR STRIP-MCNC: NEGATIVE MG/DL
RBC # BLD AUTO: 4.07 M/UL (ref 4.2–5.3)
RBC #/AREA URNS HPF: ABNORMAL /HPF (ref 0–5)
SODIUM SERPL-SCNC: 135 MMOL/L (ref 136–145)
SP GR UR REFRACTOMETRY: 1.02 (ref 1–1.03)
UROBILINOGEN UR QL STRIP.AUTO: 1 EU/DL (ref 0.2–1)
WBC # BLD AUTO: 12.7 K/UL (ref 4.6–13.2)
WBC URNS QL MICRO: ABNORMAL /HPF (ref 0–5)

## 2020-12-20 PROCEDURE — 80053 COMPREHEN METABOLIC PANEL: CPT

## 2020-12-20 PROCEDURE — 84702 CHORIONIC GONADOTROPIN TEST: CPT

## 2020-12-20 PROCEDURE — 81025 URINE PREGNANCY TEST: CPT

## 2020-12-20 PROCEDURE — 85025 COMPLETE CBC W/AUTO DIFF WBC: CPT

## 2020-12-20 PROCEDURE — 81001 URINALYSIS AUTO W/SCOPE: CPT

## 2020-12-20 PROCEDURE — 99282 EMERGENCY DEPT VISIT SF MDM: CPT

## 2020-12-20 RX ORDER — PROMETHAZINE HYDROCHLORIDE 25 MG/1
25 TABLET ORAL
Qty: 12 TAB | Refills: 0 | OUTPATIENT
Start: 2020-12-20 | End: 2022-01-02

## 2020-12-20 NOTE — ED TRIAGE NOTES
Patient arrived to ED with c/o nausea x 2 week. Patient states the only thing that helps is smoking marijuana.  Patient actively drinking sprint in triage without issue

## 2020-12-20 NOTE — ED PROVIDER NOTES
EMERGENCY DEPARTMENT HISTORY AND PHYSICAL EXAM    Date: 12/20/2020  Patient Name: Morro Vicente    History of Presenting Illness     Chief Complaint   Patient presents with    Nausea         History Provided By: Patient    6:24 PM  Morro Vicente is a 35 y.o. female who presents to the emergency department C/O nausea and dry-heaving x2 to 3 weeks. States has had to smoke marijuana to treat nausea as that provides temporary relief. Has taken pregnancy test that were negative. Patient had her Implanon removed 2 to 3 months ago but has not had a period. Associated intermittent mild diarrhea. Pt denies abdominal pain, fever, dysuria, and any other sxs or complaints. PCP: None    Current Outpatient Medications   Medication Sig Dispense Refill    promethazine (PHENERGAN) 25 mg tablet Take 1 Tab by mouth every six (6) hours as needed for Nausea. 12 Tab 0    neomycin-polymyxin-hydrocortisone, buffered, (PEDIOTIC) 3.5-10,000-1 mg/mL-unit/mL-% otic suspension Administer 3 Drops in right ear four (4) times daily. 10 mL 0    HYDROcodone-acetaminophen (NORCO) 5-325 mg per tablet Take 1 Tab by mouth every four (4) hours as needed for Pain. Max Daily Amount: 6 Tabs. 8 Tab 0    ibuprofen (MOTRIN) 600 mg tablet Take 1 Tab by mouth every six (6) hours as needed for Pain.  20 Tab 0       Past History     Past Medical History:  Past Medical History:   Diagnosis Date    Anemia     Calculus of kidney     Depression     Former smoker     Morbid obesity ([address]     Morbid obesity with BMI of 45.0-49.9, adult ([facility] [address]     Suicidal thoughts        Past Surgical History:  Past Surgical History:   Procedure Laterality Date    CYSTOSCOPY      stone extraction    HX CHOLECYSTECTOMY         Family History:  Family History   Problem Relation Age of Onset    Obesity Father        Social History:  Social History     Tobacco Use    Smoking status: Former Smoker     Packs/day: 0.25     Quit date: 6/22/2017     Years since quitting: 3.4  Smokeless tobacco: Never Used   Substance Use Topics    Alcohol use: No     Alcohol/week: 1.7 standard drinks     Types: 2 Cans of beer per week     Comment: social pt reports seldom use can not recall last use    Drug use: Not Currently     Types: Marijuana     Comment: \" I only used it Sunday one time denies active user       Allergies:  No Known Allergies      Review of Systems   Review of Systems   Constitutional: Negative for fever. Gastrointestinal: Positive for diarrhea, nausea and vomiting. Negative for abdominal pain. Genitourinary: Negative for dysuria. All other systems reviewed and are negative. Physical Exam     Vitals:    12/20/20 1633   BP: (!) 149/108   Pulse: (!) 103   Resp: 18   Temp: 97.8 °F (36.6 °C)   SpO2: 100%   Weight: 107 kg (236 lb)     Physical Exam  Vital signs and nursing notes reviewed. CONSTITUTIONAL: Alert. Well-appearing; well-nourished; in no apparent distress. HEAD: Normocephalic; atraumatic. CV: Normal S1, S2; no murmurs, rubs, or gallops. No chest wall tenderness. RESPIRATORY: Normal chest excursion with respiration; breath sounds clear and equal bilaterally; no wheezes, rhonchi, or rales. GI: Normal bowel sounds; non-distended; non-tender; no guarding or rigidity; no palpable organomegaly. No CVA tenderness. BACK:  No evidence of trauma or deformity. Non-tender to palpation. FROM without difficulty. Negative straight leg raise bilaterally. EXT: Normal ROM in all four extremities; non-tender to palpation. SKIN: Normal for age and race; warm; dry; good turgor; no apparent lesions or exudate. NEURO: A & O x3. PSYCH:  Mood and affect appropriate.            Diagnostic Study Results     Labs -     Recent Results (from the past 12 hour(s))   CBC WITH AUTOMATED DIFF    Collection Time: 12/20/20  4:50 PM   Result Value Ref Range    WBC 12.7 4.6 - 13.2 K/uL    RBC 4.07 (L) 4.20 - 5.30 M/uL    HGB 12.3 12.0 - 16.0 g/dL    HCT 34.6 (L) 35.0 - 45.0 % MCV 85.0 74.0 - 97.0 FL    MCH 30.2 24.0 - 34.0 PG    MCHC 35.5 31.0 - 37.0 g/dL    RDW 14.3 11.6 - 14.5 %    PLATELET 665 (H) 535 - 420 K/uL    MPV 8.0 (L) 9.2 - 11.8 FL    NEUTROPHILS 71 40 - 73 %    LYMPHOCYTES 22 21 - 52 %    MONOCYTES 6 3 - 10 %    EOSINOPHILS 1 0 - 5 %    BASOPHILS 0 0 - 2 %    ABS. NEUTROPHILS 9.0 (H) 1.8 - 8.0 K/UL    ABS. LYMPHOCYTES 2.8 0.9 - 3.6 K/UL    ABS. MONOCYTES 0.8 0.05 - 1.2 K/UL    ABS. EOSINOPHILS 0.1 0.0 - 0.4 K/UL    ABS. BASOPHILS 0.0 0.0 - 0.1 K/UL    DF AUTOMATED     METABOLIC PANEL, COMPREHENSIVE    Collection Time: 12/20/20  4:50 PM   Result Value Ref Range    Sodium 135 (L) 136 - 145 mmol/L    Potassium 3.7 3.5 - 5.5 mmol/L    Chloride 103 100 - 111 mmol/L    CO2 25 21 - 32 mmol/L    Anion gap 7 3.0 - 18 mmol/L    Glucose 98 74 - 99 mg/dL    BUN 5 (L) 7.0 - 18 MG/DL    Creatinine 0.66 0.6 - 1.3 MG/DL    BUN/Creatinine ratio 8 (L) 12 - 20      GFR est AA >60 >60 ml/min/1.73m2    GFR est non-AA >60 >60 ml/min/1.73m2    Calcium 9.4 8.5 - 10.1 MG/DL    Bilirubin, total 0.8 0.2 - 1.0 MG/DL    ALT (SGPT) 67 (H) 13 - 56 U/L    AST (SGOT) 24 10 - 38 U/L    Alk.  phosphatase 88 45 - 117 U/L    Protein, total 8.0 6.4 - 8.2 g/dL    Albumin 3.7 3.4 - 5.0 g/dL    Globulin 4.3 (H) 2.0 - 4.0 g/dL    A-G Ratio 0.9 0.8 - 1.7     BETA HCG, QT    Collection Time: 12/20/20  4:50 PM   Result Value Ref Range    Beta HCG, QT 46,432 (H) 0 - 10 MIU/ML   HCG URINE, QL    Collection Time: 12/20/20  5:45 PM   Result Value Ref Range    HCG urine, QL Positive (A) NEG     URINALYSIS W/ RFLX MICROSCOPIC    Collection Time: 12/20/20  5:45 PM   Result Value Ref Range    Color YELLOW      Appearance CLOUDY      Specific gravity 1.021 1.005 - 1.030      pH (UA) 7.0 5.0 - 8.0      Protein Negative NEG mg/dL    Glucose Negative NEG mg/dL    Ketone 40 (A) NEG mg/dL    Bilirubin Negative NEG      Blood MODERATE (A) NEG      Urobilinogen 1.0 0.2 - 1.0 EU/dL    Nitrites Negative NEG      Leukocyte Esterase SMALL (A) NEG     URINE MICROSCOPIC ONLY    Collection Time: 12/20/20  5:45 PM   Result Value Ref Range    WBC 2 to 4 0 - 5 /hpf    RBC 3 to 6 0 - 5 /hpf    Epithelial cells 3+ 0 - 5 /lpf    Bacteria 1+ (A) NEG /hpf    Mucus 1+ (A) NEG /lpf       Radiologic Studies -   No orders to display     CT Results  (Last 48 hours)    None        CXR Results  (Last 48 hours)    None          Medications given in the ED-  Medications - No data to display      Medical Decision Making   I am the first provider for this patient. I reviewed the vital signs, available nursing notes, past medical history, past surgical history, family history and social history. Vital Signs-Reviewed the patient's vital signs. Records Reviewed: Nursing Notes      Procedures:  Procedures    ED Course:  6:24 PM   Initial assessment performed. The patients presenting problems have been discussed, and they are in agreement with the care plan formulated and outlined with them. I have encouraged them to ask questions as they arise throughout their visit. Provider Notes (Medical Decision Making): Joshua Mendoza is a 35 y.o. female who presented with 2 to 3 weeks of nausea, dry heaving. No abdominal pain, exam unremarkable and abdomen nontender. Denies vaginal bleeding. Has not had a period since discontinuing birth control 3 months ago. Her pregnancy test here is positive, confirmed by beta hCG of >46,000. She does not have any pain to warrant emergent imaging at this time and agrees to follow-up with her OB/GYN, return to ED if any vaginal bleeding or abdominal pain. Phenergan prescribed as needed for nausea. Diagnosis and Disposition       DISCHARGE NOTE:    Kirsten Aviles  results have been reviewed with her. She has been counseled regarding her diagnosis, treatment, and plan.   She verbally conveys understanding and agreement of the signs, symptoms, diagnosis, treatment and prognosis and additionally agrees to follow up as discussed. She also agrees with the care-plan and conveys that all of her questions have been answered. I have also provided discharge instructions for her that include: educational information regarding their diagnosis and treatment, and list of reasons why they would want to return to the ED prior to their follow-up appointment, should her condition change. She has been provided with education for proper emergency department utilization. CLINICAL IMPRESSION:    1. Non-intractable vomiting with nausea, unspecified vomiting type    2. Pregnancy test positive for incidental pregnancy        PLAN:  1. D/C Home  2. Current Discharge Medication List      START taking these medications    Details   promethazine (PHENERGAN) 25 mg tablet Take 1 Tab by mouth every six (6) hours as needed for Nausea. Qty: 12 Tab, Refills: 0           3. Follow-up Information     Follow up With Specialties Details Why [address]  Schedule an appointment as soon as possible for a visit  [id number] [place] [address] [address]    [facility] Emergency Medicine  As needed, If symptoms worsen [address] [address]  [phone]        _______________________________      Please note that this dictation was completed with [employer], the computer voice recognition software. Quite often unanticipated grammatical, syntax, homophones, and other interpretive errors are inadvertently transcribed by the computer software. Please disregard these errors. Please excuse any errors that have escaped final proofreading.

## 2020-12-21 NOTE — DISCHARGE INSTRUCTIONS
Patient Education        Nausea and Vomiting: Care Instructions  Your Care Instructions     When you are nauseated, you may feel weak and sweaty and notice a lot of saliva in your mouth. Nausea often leads to vomiting. Most of the time you do not need to worry about nausea and vomiting, but they can be signs of other illnesses. Two common causes of nausea and vomiting are stomach flu and food poisoning. Nausea and vomiting from viral stomach flu will usually start to improve within 24 hours. Nausea and vomiting from food poisoning may last from 12 to 48 hours. The doctor has checked you carefully, but problems can develop later. If you notice any problems or new symptoms, get medical treatment right away. Follow-up care is a key part of your treatment and safety. Be sure to make and go to all appointments, and call your doctor if you are having problems. It's also a good idea to know your test results and keep a list of the medicines you take. How can you care for yourself at home? · To prevent dehydration, drink plenty of fluids, enough so that your urine is light yellow or clear like water. Choose water and other caffeine-free clear liquids until you feel better. If you have kidney, heart, or liver disease and have to limit fluids, talk with your doctor before you increase the amount of fluids you drink. · Rest in bed until you feel better. · When you are able to eat, try clear soups, mild foods, and liquids until all symptoms are gone for 12 to 48 hours. Other good choices include dry toast, crackers, cooked cereal, and gelatin dessert, such as Jell-O. When should you call for help? Call 911 anytime you think you may need emergency care. For example, call if:    · You passed out (lost consciousness). Call your doctor now or seek immediate medical care if:    · You have symptoms of dehydration, such as:  ? Dry eyes and a dry mouth. ? Passing only a little dark urine. ?  Feeling thirstier than usual.   · You have new or worsening belly pain.     · You have a new or higher fever.     · You vomit blood or what looks like coffee grounds. Watch closely for changes in your health, and be sure to contact your doctor if:    · You have ongoing nausea and vomiting.     · Your vomiting is getting worse.     · Your vomiting lasts longer than 2 days.     · You are not getting better as expected. Where can you learn more? Go to http://www.grant.com/  Enter H591 in the search box to learn more about \"Nausea and Vomiting: Care Instructions. \"  Current as of: June 26, 2019               Content Version: 12.6  © 0834-1406 [employer]. Care instructions adapted under license by [employer] (which disclaims liability or warranty for this information). If you have questions about a medical condition or this instruction, always ask your healthcare professional. [address] any warranty or liability for your use of this information. Patient Education        Learning About Pregnancy  Your Care Instructions     Your health in the early weeks of your pregnancy is particularly important for your baby's health. Take good care of yourself. Anything you do that harms your body can also harm your baby. Make sure to go to all of your doctor appointments. Regular checkups will help keep you and your baby healthy. How can you care for yourself at home? Diet    · Eat a balanced diet. Make sure your diet includes plenty of beans, peas, and leafy green vegetables.     · Do not skip meals or go for many hours without eating. If you are nauseated, try to eat a small, healthy snack every 2 to 3 hours.     · Do not eat fish that has a high level of mercury, such as shark, swordfish, or mackerel. Do not eat more than one can of tuna each week.     · Drink plenty of fluids, enough so that your urine is light yellow or clear like water.  If you have kidney, heart, or liver disease and have to limit fluids, talk with your doctor before you increase the amount of fluids you drink.     · Cut down on caffeine, such as coffee, tea, and cola.     · Do not drink alcohol, such as beer, wine, or hard liquor.     · Take a multivitamin that contains at least 400 micrograms (mcg) of folic acid to help prevent birth defects. Fortified cereal and whole wheat bread are good additional sources of folic acid.     · Increase the calcium in your diet. Try to drink a quart of skim milk each day. You may also take calcium supplements and choose foods such as cheese and yogurt. Lifestyle    · Make sure you go to your follow-up appointments.     · Get plenty of rest. You may be unusually tired while you are pregnant.     · Get at least 30 minutes of exercise on most days of the week. Walking is a good choice. If you have not exercised in the past, start out slowly. Take several short walks each day.     · Do not smoke. If you need help quitting, talk to your doctor about stop-smoking programs. These can increase your chances of quitting for good.     · Do not touch cat feces or litter boxes. Also, wash your hands after you handle raw meat, and fully cook all meat before you eat it. Wear gloves when you work in the yard or garden, and wash your hands well when you are done. Cat feces, raw or undercooked meat, and contaminated dirt can cause an infection that may harm your baby or lead to a miscarriage.     · Do not use saunas or hot tubs. Raising your body temperature may harm your baby.     · Avoid chemical fumes, paint fumes, or poisons.     · Do not use illegal drugs or alcohol. Medicines    · Review all of your medicines with your doctor. Some of your routine medicines may need to be changed to protect your baby.     · Use acetaminophen (Tylenol) to relieve minor problems, such as a mild headache or backache or a mild fever with cold symptoms.  Do not use nonsteroidal anti-inflammatory drugs (NSAIDs), such as ibuprofen (Advil, Motrin) or naproxen (Aleve), unless your doctor says it is okay.     · Do not take two or more pain medicines at the same time unless the doctor told you to. Many pain medicines have acetaminophen, which is Tylenol. Too much acetaminophen (Tylenol) can be harmful.     · Take your medicines exactly as prescribed. Call your doctor if you think you are having a problem with your medicine. To manage morning sickness    · If you feel sick when you first wake up, try eating a small snack (such as crackers) before you get out of bed. Allow some time to digest the snack, and then get out of bed slowly.     · Do not skip meals or go for long periods without eating. An empty stomach can make nausea worse.     · Eat small, frequent meals instead of three large meals each day.     · Drink plenty of fluids. Sports drinks, such as Gatorade or Powerade, are good choices.     · Eat foods that are high in protein but low in fat.     · If you are taking iron supplements, ask your doctor if they are necessary. Iron can make nausea worse.     · Avoid any smells, such as coffee, that make you feel sick.     · Get lots of rest. Morning sickness may be worse when you are tired. Follow-up care is a key part of your treatment and safety. Be sure to make and go to all appointments, and call your doctor if you are having problems. It's also a good idea to know your test results and keep a list of the medicines you take. Where can you learn more? Go to http://www.gray.com/  Enter M696 in the search box to learn more about \"Learning About Pregnancy. \"  Current as of: February 11, 2020               Content Version: 12.6  © 2755-9532 [employer]. Care instructions adapted under license by [employer] (which disclaims liability or warranty for this information).  If you have questions about a medical condition or this instruction, always ask your healthcare professional. [address] any warranty or liability for your use of this information.

## 2021-01-18 ENCOUNTER — OFFICE VISIT (OUTPATIENT)
Dept: SURGERY | Age: 31
End: 2021-01-18

## 2021-01-18 DIAGNOSIS — E66.01 MORBID OBESITY (HCC): Primary | ICD-10-CM

## 2021-01-19 VITALS — BODY MASS INDEX: 40.29 KG/M2 | HEIGHT: 64 IN | WEIGHT: 236 LBS

## 2021-01-19 NOTE — PROGRESS NOTES
[facility] [place] participated in an educational session on the importance of starting to make healthy choices prior to weight loss surgery. General healthy foods were reviewed. Diet history was reviewed. Patient set a dietary, exercise, and behavioral goal in order to start making healthy changes now. Visit Vitals  Ht 5' 4\" (1.626 m)   Wt 107 kg (236 lb)   BMI 40.51 kg/m²           Today the majority of our visit was spent reviewing patient's food recall and identifying areas for improvement. Educated  on the importance of eating 3 meals/day at regularly scheduled times including breakfast within 1st 1-2 hours of waking. Suggested patient use a protein supplement as a meal replacement instead of skipping OR as a between meal high protein snack. Also educated on the importance of including a protein with every meal and snack and reviewed lean sources. Lastly introduced  the Plate Method for Meal Planning and reviewed portions recommended with tips for portion control. Discussed label reading and recommendations for carbohydrate counting. Reviewed the importance of adequate hydration and protein intake - emphasizing the role of protein supplements in the post operative diet. Discussed vitamin and mineral supplementation forever following surgery. Encouraged patient to review key diet principles of surgery and we will discuss individual questions or concerns. Patient was  receptive to education and we will continue to review and reinforce in our follow-up next month. Goals:   1. Work to increase to 3-4 small meals per day, with planned snacks as needed. Recommend following plate method for meal planning - focusing on lean protein, non-starchy vegetables, and measured amounts of starch. - Goal of  g protein and  g carbohydrate per day. - Recommend continuing protein supplement as meal replacement at least 1x/day  2. Increase non caloric fluid to 64 oz per day.   Eliminate caffeine, added sugar, carbonation, and straws.               -Continue to work to decrease sugar sweetened beverages - goal of calorie free beverages only              -Must eliminate caffeine prior to surgery and avoid for ~6-8 weeks  3. Start activity regimen, work to increase ADL              -Try to start walking for at least 30-60  minutes 3-5 days per week  4. Start Complete MVI, no gummies    01/18/2021    Dl Du

## 2021-02-18 ENCOUNTER — CLINICAL SUPPORT (OUTPATIENT)
Dept: SURGERY | Age: 31
End: 2021-02-18

## 2021-02-18 VITALS — BODY MASS INDEX: 42.88 KG/M2 | WEIGHT: 251.2 LBS | HEIGHT: 64 IN

## 2021-02-18 DIAGNOSIS — E66.01 MORBID OBESITY (HCC): Primary | ICD-10-CM

## 2021-02-18 NOTE — PROGRESS NOTES
[facility] Weight [facility]Disciplinary Program    Name: Michelle Silver   : 1990    Session# 2 of 6  Date: 2021    Visit Vitals  Ht 5' 4\" (1.626 m)   Wt 113.9 kg (251 lb 3.2 oz)   BMI 43.12 kg/m²     *Pt's weight is self-reported due to tele-health nutrition visits during [address] pandemic. Dietary Instructions    Reviewed intake  Understanding low carbohydrates, low sugar, higher protein meals  Instruction given for personal dietary changes  Discussed perceived compliance  Comments: RD Reviewed Diet History and Physical Activity/Exercise habits. Recommended dietary changes for both before and after surgery. Reviewed recommendation to follow [id number] calorie diet, working to reduce total carbohydrate intake to  g or less per day and increasing protein intake to  g per day, compared current intake to recommendations. Recommend pt adopt an exercise routine of at least 3-5 days a week for at least 30 minutes/day. If pt unable to participate in walking, maik, swimming, or other exercises, recommend pt work with a physical therapist and/or participate in chair exercises, yoga, and/or increase activities of daily living as able. Patient participated in pre-recorded education class video. Recorded video of dietitian discussing role of low carbohydrate diet for promoting weight loss before and after surgery. Reviewed sources of carbohydrates, label reading tips and reviewed exchange list for carbohydrates. Discussed ways to reduce carbohydrates and reviewed example day of high carbohydrate vs. Low carbohydrate diet. This class reviewed materials provided at patients initial appointment and provided in education packet to patient. Patient watched the video in its entirety and turned in the 3 embedded passcodes from the video session.       Behavior Modification    Identify obstacles to trigger change  Achieving/Rewarding goals met  Positive attitude  Comments: Reinforced importance continuing to modify lifestyle patterns and behaviors to promote weight loss and long term weight maintenance. Pt instructed to start carbohydrate counting and label reading. Recommend pt start reducing and eliminating sugar-sweetened beverages, concentrated sweets (cakes/candy/cupcakes/poptarts/cereals/etc.), juice, and high-carbohydrate foods. Comments: Pt able to teach back that whey is best quality protein and the importance of protein post-op. Pt able to teach back that 15g of carb = a carbohydrate serving. Pt also acknowledges that lifestyle changes are required to have successful weight-loss maintenance post-op. Pt has made the following lifestyle changes this month: adding more protein to diet, slowly eliminating soda (still having a cherry pepsi every other day but working on eliminating), and increased walking (15-20 walk daily on work break). Pt using eggs, greek yogurt, lean meats, and protein powder. Pt drinking smoothie for breakfast - educated pt on fruit & milk as a carb source. Pt drinking 3 to 4 bottles of water a day and has eliminated fast food. Pt eating 2 to 3 times a day. Physical Activity/Exercise      Patient has been educated on the importance of starting a physical activity regimen, reinforced the importance of regular physical activity for total health and weight management. Suggested starting exercise regimen of cardiovascular and resistance training for at least 3-5 days per week for 30-60 minutes. Encouraged pt to set and keep weekly exercise goals. Goals:   1. Work to increase to 3-4 small meals per day, with planned snacks as needed. Recommend following plate method for meal planning - focusing on lean protein, non-starchy vegetables, and measured amounts of starch. - Goal of  g protein and  g carbohydrate per day. - Recommend continuing protein supplement as meal replacement at least 1x/day OR as high protein snack option  2.  Increase non caloric fluid to 64 oz per day.  Eliminate caffeine, added sugar, carbonation, and straws.               -Continue to work to decrease sugar sweetened beverages - goal of calorie free beverages only              -Must eliminate caffeine prior to surgery and avoid for ~6-8 weeks   -Practice 30:30 rule,  food and flood   3. Start activity regimen, work to increase ADL  4. Start Complete MVI    Candidate for surgery (per RD): PENDING

## 2021-03-18 ENCOUNTER — DOCUMENTATION ONLY (OUTPATIENT)
Dept: BARIATRICS/WEIGHT MGMT | Age: 31
End: 2021-03-18

## 2021-03-18 NOTE — PROGRESS NOTES
3/18/2021: Patient missed their nutrition Zoom WLT class today @ 3:00 pm.  This was patient's 3/6 class.       Jeff Reinoso RD

## 2021-03-22 ENCOUNTER — DOCUMENTATION ONLY (OUTPATIENT)
Dept: BARIATRICS/WEIGHT MGMT | Age: 31
End: 2021-03-22

## 2021-03-29 ENCOUNTER — HOSPITAL ENCOUNTER (OUTPATIENT)
Dept: BARIATRICS/WEIGHT MGMT | Age: 31
Discharge: HOME | End: 2021-03-29

## 2021-03-31 NOTE — PROGRESS NOTES
[facility]  [address], [address]    Patient's Name: Lino Perez   Age: 35 y.o. YOB: 1990   Sex: female    Date:   3/29/2021   Session: 3 of  6  Surgeon:  Sriram Momin    Height: 64\" Weight:    251      Lbs. BMI: 43.1    Starting Weight: 251     Overall Pounds Lost: 0   Overall Pounds Gained: 0      Do you smoke? Patient does not smoke    Alcohol intake: Patient does not drink  Number of drinks at a time:  0  Number of times a week: 0    Class Guidelines    Guidelines are reviewed with patient at the start of every class. 1. Patient understands that weight loss trial classes must be consecutive. Patient understands if they miss a class, it is their responsibility to contact me to reschedule class. I will reach out to patient after their first no show. 2.  Patient understands the expectations that weight maintenance/weight loss is expected during the classes. Failure to demonstrate changes may result in one extra month of weight loss trial, followed by going back to see the surgeon. 3. Patient is also instructed to be doing their labs, blood work, psych visit, support group and any other test that the surgeon has used while they are working on their weight loss trial.    Changes Made: taking up more walking opportunities; stopped drinking soda; being consistent; cuting out starches slowly but surely. Dietary Instruction    During today's class, we continued to focus on the key diet principles. Patient was instructed to follow a low carbohydrate diet, focusing on meat and vegetables. Patient was instructed to stop liquid calories and aim for 64 ounces of water per day.  We focused on focusing in on bigger problem areas to start making changes to, such as reducing fast food intake, reducing carbonated beverages/soda intake, decreasing carbohydrates intake daily, etc. We reviewed protein shakes and high protein yogurts to chose, as well. Patient was educated on good breakfast ideas and high protein snacks. I also reviewed with patient the vitamins that they will need to take post op. Patient will hear this information again at pre op class prior to surgery, but I felt it was important to prepare them now. Patient will be taking 2 multivitamin complete per day, 100 mg of Vitamin B1, 5000 IU of Vitamin D3, 1000 mcg Vitamin B12, 1500 mg of calcium citrate. Patient's diet habits include: Eating 3 meals daily, consisting of chicken breast, any veggie, or salad w/ spring mix, feta cheese, chicken and cranberries. Carb sources come from dairy. Snacking 3-4x/day on boiled eggs, yogurt, fruit, and protein shakes. Struggled to let go of cherry pepsi, but doing well for the past 2 months, now working on letting go of candy. Drinking 64-80 oz of water daily, w/ Gatorade or Powerade w/ meals. Physical Activity/Exercise    Comments:     Currently for exercise, patient is walking, jogging 3x/week for 30 min-1 hour. We talked about activities for patient to do, including walking, swimming, or chair exercises, as well as some additional steps to take in the day to get extra movement, such as parking further away, walking dog, taking stairs vs elevator, etc. Patient was encouraged to start up an exercise routine and build on it. Behavior Modification  Comments:   Emphasized the importance of eating slowly, not eating and drinking meals at the same time. Discussed Key Behavior principles to start implementing to be successful following surgery, such as, importance of 3 meals daily, keeping a food journal, avoid distractions during meal times, and chewing food thoroughly, as a few examples. Patient's S.M.A.R.T. Goals are:  1. Portion control  2. Try to accelerate a little more in terms of exercise  3.  Include family in activities and diet      Ally Mcintyre, RADHA  3/29/2021

## 2021-04-26 ENCOUNTER — HOSPITAL ENCOUNTER (OUTPATIENT)
Dept: BARIATRICS/WEIGHT MGMT | Age: 31
Discharge: HOME | End: 2021-04-26

## 2021-04-29 ENCOUNTER — DOCUMENTATION ONLY (OUTPATIENT)
Dept: BARIATRICS/WEIGHT MGMT | Age: 31
End: 2021-04-29

## 2021-05-03 NOTE — PROGRESS NOTES
[facility]  [address], [address]    Patient's Name: Mary Ellen Hooker   Age: 35 y.o. YOB: 1990   Sex: female    Date:   4/29/2021    Session: 4 of  6  Surgeon:  Aleks Smith    Height: 64\" Weight:    245      Lbs  BMI: 42     Previous Month's Weight: 251  Pounds Lost since last month: 6                 Pounds Gained since last month: 0    Starting Weight: 251     Overall Pounds Lost: 6   Overall Pounds Gained: 0      Do you smoke? Pt does not smoke    Alcohol intake: Pt does not drink  Number of drinks at a time:  0  Number of times a week: 0    Class Guidelines    Guidelines are reviewed with patient at the start of every class. 1. Patient understands that weight loss trial classes must be consecutive. Patient understands if they miss a class, it is their responsibility to contact me to reschedule class. I will reach out to patient after their first no show. 2.  Patient understands the expectations that weight maintenance/weight loss is expected during the classes. Failure to demonstrate changes may result in one extra month of weight loss trial, followed by going back to see the surgeon. 3. Patient is also instructed to be doing their labs, blood work, psych visit, support group and any other test that the surgeon has used while they are working on their weight loss trial.    Changes Made: Stopped smoking and increased water intake. Stopped eating beef. Open to more fish and seasfood in meals and making healthier choices w/ family. Dietary Instruction    During today's class, we continued to focus on the key diet principles. Patient was instructed to follow a low carbohydrate diet, focusing on meat and vegetables. Patient was instructed to stop liquid calories and aim for 64 ounces of water per day.  We focused on focusing in on bigger problem areas to start making changes to, such as reducing fast food intake, reducing carbonated beverages/soda intake, decreasing carbohydrates intake daily, etc. We reviewed protein shakes and high protein yogurts to chose, as well. Patient was educated on good breakfast ideas and high protein snacks. I also reviewed with patient the post-op diet. Patient will hear this information again at pre op class prior to surgery, but I felt it was important to prepare them now. We covered the 7 day Pre-Op liquid diet prior to surgery, as well as the 7 day post-op liquid diet to get the body adjusted to their new pouch and \"jumpstart\" weight loss. We discussed portion sizes, focus on protein foods for the 1st 6 weeks post-op and approved SF fluids to consume. Patient understanded the importance of following this diet post op as to not regain weight and go back to old habits, and be most successful w/ their weight loss journey. Patient understands that surgery is only a tool in weight loss. Patient's diet habits include: Eating 3 meals daily, consisting of protein shake w/ banana and coffee in the morning; tuna or chicken w/ spinach and feta for lunch; fish or chicken w/ brussels sprouts for dinner. Biggest portion of meal comes from veggies. Carbs: bread, fruit. Snacking 2x/day on boiled eggs, yogurt, fruit, or protein shakes. Struggling to completely let go of cheese. Drinking 48 oz water daily, coffee, smoothies, and gatorade. Physical Activity/Exercise    Comments:     Currently for exercise, patient is walking or going to the gym 2-3x/week. We talked about activities for patient to do, including walking, swimming, or chair exercises, as well as some additional steps to take in the day to get extra movement, such as parking further away, walking dog, taking stairs vs elevator, etc. Patient was encouraged to start up an exercise routine and build on it. Behavior Modification  Comments:   Emphasized the importance of eating slowly, not eating and drinking meals at the same time.   Discussed Key Behavior principles to start implementing to be successful following surgery, such as, importance of 3 meals daily, keeping a food journal, avoid distractions during meal times, and chewing food thoroughly, taking 20-30 minutes to eat a meal, as a few examples. Patient understands the importance of following through with these behaviors following surgery to aid in long term weight loss. Patient's S.M.A.R.T. Goals are:  1. Portion control  2. Stay longer in the gym  3. Less dairy; better eating times    Patient has not attended a support group meeting.       Aleks Marinelli, RADHA  4/29/2021

## 2021-05-27 ENCOUNTER — DOCUMENTATION ONLY (OUTPATIENT)
Dept: BARIATRICS/WEIGHT MGMT | Age: 31
End: 2021-05-27

## 2021-07-06 ENCOUNTER — DOCUMENTATION ONLY (OUTPATIENT)
Dept: BARIATRICS/WEIGHT MGMT | Age: 31
End: 2021-07-06

## 2021-07-06 NOTE — PROGRESS NOTES
7/6/2021: Patient gave me a call inquiring restarting the Bariatric program. I returned patient's call and LVM w/ [facility]'s phone # so that she can start with the dietitian there, as I believe that would be the best course of action in this situation.       Jose R Matamoros RD

## 2021-11-30 ENCOUNTER — APPOINTMENT (OUTPATIENT)
Dept: ULTRASOUND IMAGING | Age: 31
End: 2021-11-30
Attending: PHYSICIAN ASSISTANT
Payer: MEDICAID

## 2021-11-30 ENCOUNTER — HOSPITAL ENCOUNTER (EMERGENCY)
Age: 31
Discharge: HOME | End: 2021-11-30
Attending: EMERGENCY MEDICINE
Payer: MEDICAID

## 2021-11-30 VITALS
DIASTOLIC BLOOD PRESSURE: 99 MMHG | RESPIRATION RATE: 17 BRPM | TEMPERATURE: 98.2 F | OXYGEN SATURATION: 100 % | SYSTOLIC BLOOD PRESSURE: 140 MMHG | BODY MASS INDEX: 42.68 KG/M2 | HEART RATE: 82 BPM | HEIGHT: 64 IN | WEIGHT: 250 LBS

## 2021-11-30 DIAGNOSIS — N30.01 ACUTE CYSTITIS WITH HEMATURIA: ICD-10-CM

## 2021-11-30 DIAGNOSIS — N76.0 BV (BACTERIAL VAGINOSIS): ICD-10-CM

## 2021-11-30 DIAGNOSIS — O46.8X1 SUBCHORIONIC HEMORRHAGE OF PLACENTA IN FIRST TRIMESTER, SINGLE OR UNSPECIFIED FETUS: ICD-10-CM

## 2021-11-30 DIAGNOSIS — O41.8X10 SUBCHORIONIC HEMORRHAGE OF PLACENTA IN FIRST TRIMESTER, SINGLE OR UNSPECIFIED FETUS: ICD-10-CM

## 2021-11-30 DIAGNOSIS — A59.9 TRICHOMONAS VAGINALIS INFECTION: ICD-10-CM

## 2021-11-30 DIAGNOSIS — O20.0 THREATENED MISCARRIAGE IN EARLY PREGNANCY: Primary | ICD-10-CM

## 2021-11-30 DIAGNOSIS — B96.89 BV (BACTERIAL VAGINOSIS): ICD-10-CM

## 2021-11-30 LAB
ANION GAP SERPL CALC-SCNC: 6 MMOL/L (ref 3–18)
APPEARANCE UR: ABNORMAL
BACTERIA URNS QL MICRO: ABNORMAL /HPF
BASOPHILS # BLD: 0 K/UL (ref 0–0.1)
BASOPHILS NFR BLD: 0 % (ref 0–2)
BILIRUB UR QL: NEGATIVE
BUN SERPL-MCNC: 12 MG/DL (ref 7–18)
BUN/CREAT SERPL: 17 (ref 12–20)
CALCIUM SERPL-MCNC: 9 MG/DL (ref 8.5–10.1)
CHLORIDE SERPL-SCNC: 104 MMOL/L (ref 100–111)
CO2 SERPL-SCNC: 28 MMOL/L (ref 21–32)
COLOR UR: YELLOW
CREAT SERPL-MCNC: 0.7 MG/DL (ref 0.6–1.3)
DIFFERENTIAL METHOD BLD: ABNORMAL
EOSINOPHIL # BLD: 0.2 K/UL (ref 0–0.4)
EOSINOPHIL NFR BLD: 2 % (ref 0–5)
EPITH CASTS URNS QL MICRO: ABNORMAL /LPF (ref 0–5)
ERYTHROCYTE [DISTWIDTH] IN BLOOD BY AUTOMATED COUNT: 14.3 % (ref 11.6–14.5)
GLUCOSE SERPL-MCNC: 89 MG/DL (ref 74–99)
GLUCOSE UR STRIP.AUTO-MCNC: NEGATIVE MG/DL
HCG SERPL-ACNC: 2929 MIU/ML (ref 0–10)
HCG UR QL: POSITIVE
HCT VFR BLD AUTO: 38.5 % (ref 35–45)
HGB BLD-MCNC: 12.3 G/DL (ref 12–16)
HGB UR QL STRIP: ABNORMAL
IMM GRANULOCYTES # BLD AUTO: 0 K/UL (ref 0–0.04)
IMM GRANULOCYTES NFR BLD AUTO: 0 % (ref 0–0.5)
KETONES UR QL STRIP.AUTO: NEGATIVE MG/DL
LEUKOCYTE ESTERASE UR QL STRIP.AUTO: ABNORMAL
LYMPHOCYTES # BLD: 2.9 K/UL (ref 0.9–3.6)
LYMPHOCYTES NFR BLD: 32 % (ref 21–52)
MCH RBC QN AUTO: 28.4 PG (ref 24–34)
MCHC RBC AUTO-ENTMCNC: 31.9 G/DL (ref 31–37)
MCV RBC AUTO: 88.9 FL (ref 78–100)
MONOCYTES # BLD: 0.7 K/UL (ref 0.05–1.2)
MONOCYTES NFR BLD: 7 % (ref 3–10)
MUCOUS THREADS URNS QL MICRO: ABNORMAL /LPF
NEUTS SEG # BLD: 5.2 K/UL (ref 1.8–8)
NEUTS SEG NFR BLD: 58 % (ref 40–73)
NITRITE UR QL STRIP.AUTO: NEGATIVE
NRBC # BLD: 0 K/UL (ref 0–0.01)
NRBC BLD-RTO: 0 PER 100 WBC
PH UR STRIP: 5.5 [PH] (ref 5–8)
PLATELET # BLD AUTO: 429 K/UL (ref 135–420)
PMV BLD AUTO: 8.2 FL (ref 9.2–11.8)
POTASSIUM SERPL-SCNC: 3.9 MMOL/L (ref 3.5–5.5)
PROT UR STRIP-MCNC: NEGATIVE MG/DL
RBC # BLD AUTO: 4.33 M/UL (ref 4.2–5.3)
RBC #/AREA URNS HPF: ABNORMAL /HPF (ref 0–5)
SERVICE CMNT-IMP: NORMAL
SODIUM SERPL-SCNC: 138 MMOL/L (ref 136–145)
SP GR UR REFRACTOMETRY: 1.03 (ref 1–1.03)
UROBILINOGEN UR QL STRIP.AUTO: 1 EU/DL (ref 0.2–1)
WBC # BLD AUTO: 8.9 K/UL (ref 4.6–13.2)
WBC URNS QL MICRO: ABNORMAL /HPF (ref 0–5)
WET PREP GENITAL: NORMAL

## 2021-11-30 PROCEDURE — 80048 BASIC METABOLIC PNL TOTAL CA: CPT

## 2021-11-30 PROCEDURE — 87210 SMEAR WET MOUNT SALINE/INK: CPT

## 2021-11-30 PROCEDURE — 87491 CHLMYD TRACH DNA AMP PROBE: CPT

## 2021-11-30 PROCEDURE — 84702 CHORIONIC GONADOTROPIN TEST: CPT

## 2021-11-30 PROCEDURE — 76817 TRANSVAGINAL US OBSTETRIC: CPT

## 2021-11-30 PROCEDURE — 81025 URINE PREGNANCY TEST: CPT

## 2021-11-30 PROCEDURE — 81001 URINALYSIS AUTO W/SCOPE: CPT

## 2021-11-30 PROCEDURE — 85025 COMPLETE CBC W/AUTO DIFF WBC: CPT

## 2021-11-30 PROCEDURE — 99284 EMERGENCY DEPT VISIT MOD MDM: CPT

## 2021-11-30 RX ORDER — METRONIDAZOLE 500 MG/1
500 TABLET ORAL 2 TIMES DAILY
Qty: 20 TABLET | Refills: 0 | Status: SHIPPED | OUTPATIENT
Start: 2021-11-30 | End: 2021-12-10

## 2021-11-30 RX ORDER — NITROFURANTOIN 25; 75 MG/1; MG/1
100 CAPSULE ORAL 2 TIMES DAILY
Qty: 14 CAPSULE | Refills: 0 | Status: SHIPPED | OUTPATIENT
Start: 2021-11-30 | End: 2021-12-07

## 2021-11-30 NOTE — ED PROVIDER NOTES
EMERGENCY DEPARTMENT HISTORY AND PHYSICAL EXAM    Date: 2021  Patient Name: Villa Crow    History of Presenting Illness     Chief Complaint   Patient presents with    Vaginal Bleeding    Abdominal Cramping         History Provided By: Patient    8:23 AM  Villa Crow is a 28 y.o. female who is A4 who presents to the emergency department C/O  light vaginal bleeding almost daily for the past month. Associated mild intermittent pelvic cramping. Patient states her last normal menstrual period was mid September, prior to that had regular monthly menstrual cycles. She had one positive and one negative home pregnancy test. Blood type is O positive. Pt denies severe abdominal pain, heavy vaginal bleeding, abnormal discharge, fever, nausea, vomiting, dysuria, and any other sxs or complaints. PCP: None    Current Outpatient Medications   Medication Sig Dispense Refill    nitrofurantoin, macrocrystal-monohydrate, (Macrobid) 100 mg capsule Take 1 Capsule by mouth two (2) times a day for 7 days. 14 Capsule 0    metroNIDAZOLE (FlagyL) 500 mg tablet Take 1 Tablet by mouth two (2) times a day for 10 days. 20 Tablet 0    promethazine (PHENERGAN) 25 mg tablet Take 1 Tab by mouth every six (6) hours as needed for Nausea. 12 Tab 0    neomycin-polymyxin-hydrocortisone, buffered, (PEDIOTIC) 3.5-10,000-1 mg/mL-unit/mL-% otic suspension Administer 3 Drops in right ear four (4) times daily. 10 mL 0    HYDROcodone-acetaminophen (NORCO) 5-325 mg per tablet Take 1 Tab by mouth every four (4) hours as needed for Pain. Max Daily Amount: 6 Tabs. 8 Tab 0    ibuprofen (MOTRIN) 600 mg tablet Take 1 Tab by mouth every six (6) hours as needed for Pain.  20 Tab 0       Past History     Past Medical History:  Past Medical History:   Diagnosis Date    Anemia     Calculus of kidney     Depression     Former smoker     Morbid obesity ([address]     Morbid obesity with BMI of 45.0-49.9, adult ([address]     Suicidal thoughts Past Surgical History:  Past Surgical History:   Procedure Laterality Date    CYSTOSCOPY      stone extraction    HX CHOLECYSTECTOMY         Family History:  Family History   Problem Relation Age of Onset    Obesity Father        Social History:  Social History     Tobacco Use    Smoking status: Former Smoker     Packs/day: 0.25     Quit date: 2017     Years since quittin.4    Smokeless tobacco: Never Used   Substance Use Topics    Alcohol use: No     Alcohol/week: 1.7 standard drinks     Types: 2 Cans of beer per week     Comment: social pt reports seldom use can not recall last use    Drug use: Not Currently     Types: Marijuana     Comment: \" I only used it  one time denies active user       Allergies:  No Known Allergies      Review of Systems   Review of Systems   Constitutional: Negative for fever. Gastrointestinal: Negative for nausea and vomiting. Genitourinary: Positive for pelvic pain and vaginal bleeding. Negative for dysuria and vaginal discharge. All other systems reviewed and are negative. Physical Exam     Vitals:    21 0813 21 0814   BP: (!) 151/87    Pulse: 82    Resp: 17    Temp: 98.2 °F (36.8 °C)    SpO2: 100%    Weight:  113.4 kg (250 lb)   Height:  5' 4\" (1.626 m)     Physical Exam  Vital signs and nursing notes reviewed. CONSTITUTIONAL: Alert. Well-appearing; well-nourished; in no apparent distress. CV: Normal S1, S2; no murmurs, rubs, or gallops. No chest wall tenderness. RESPIRATORY: Normal chest excursion with respiration; breath sounds clear and equal bilaterally; no wheezes, rhonchi, or rales. GI: Normal bowel sounds; non-distended; non-tender; no guarding or rigidity; no palpable organomegaly. No CVA tenderness. PELVIC: Normal external genitalia, without rash or lesions. Speculum exam: Normal appearing cervix. Scant bloody discharge noted. Vaginal walls without lesion. Bimanual exam: No CMT, cervical os closed.  No uterine tenderness. No adnexal tenderness or mass palpated. EXT: Normal ROM in all four extremities; non-tender to palpation. SKIN: Normal for age and race; warm; dry; good turgor; no apparent lesions or exudate. NEURO: A & O x3. PSYCH:  Mood and affect appropriate. Diagnostic Study Results     Labs -     Recent Results (from the past 12 hour(s))   CBC WITH AUTOMATED DIFF    Collection Time: 11/30/21  8:24 AM   Result Value Ref Range    WBC 8.9 4.6 - 13.2 K/uL    RBC 4.33 4.20 - 5.30 M/uL    HGB 12.3 12.0 - 16.0 g/dL    HCT 38.5 35.0 - 45.0 %    MCV 88.9 78.0 - 100.0 FL    MCH 28.4 24.0 - 34.0 PG    MCHC 31.9 31.0 - 37.0 g/dL    RDW 14.3 11.6 - 14.5 %    PLATELET 434 (H) 644 - 420 K/uL    MPV 8.2 (L) 9.2 - 11.8 FL    NRBC 0.0 0  WBC    ABSOLUTE NRBC 0.00 0.00 - 0.01 K/uL    NEUTROPHILS 58 40 - 73 %    LYMPHOCYTES 32 21 - 52 %    MONOCYTES 7 3 - 10 %    EOSINOPHILS 2 0 - 5 %    BASOPHILS 0 0 - 2 %    IMMATURE GRANULOCYTES 0 0.0 - 0.5 %    ABS. NEUTROPHILS 5.2 1.8 - 8.0 K/UL    ABS. LYMPHOCYTES 2.9 0.9 - 3.6 K/UL    ABS. MONOCYTES 0.7 0.05 - 1.2 K/UL    ABS. EOSINOPHILS 0.2 0.0 - 0.4 K/UL    ABS. BASOPHILS 0.0 0.0 - 0.1 K/UL    ABS. IMM.  GRANS. 0.0 0.00 - 0.04 K/UL    DF AUTOMATED     URINALYSIS W/ RFLX MICROSCOPIC    Collection Time: 11/30/21  8:24 AM   Result Value Ref Range    Color YELLOW      Appearance CLOUDY      Specific gravity 1.030 1.005 - 1.030      pH (UA) 5.5 5.0 - 8.0      Protein Negative NEG mg/dL    Glucose Negative NEG mg/dL    Ketone Negative NEG mg/dL    Bilirubin Negative NEG      Blood LARGE (A) NEG      Urobilinogen 1.0 0.2 - 1.0 EU/dL    Nitrites Negative NEG      Leukocyte Esterase MODERATE (A) NEG     URINE MICROSCOPIC ONLY    Collection Time: 11/30/21  8:24 AM   Result Value Ref Range    WBC 21 to 35 0 - 5 /hpf    RBC 11 to 20 0 - 5 /hpf    Epithelial cells 4+ 0 - 5 /lpf    Bacteria 2+ (A) NEG /hpf    Mucus 1+ (A) NEG /lpf   BETA HCG, QT    Collection Time: 11/30/21 8:24 AM   Result Value Ref Range    Beta HCG, QT 2,929 (H) 0 - 10 MIU/ML   METABOLIC PANEL, BASIC    Collection Time: 11/30/21  8:24 AM   Result Value Ref Range    Sodium 138 136 - 145 mmol/L    Potassium 3.9 3.5 - 5.5 mmol/L    Chloride 104 100 - 111 mmol/L    CO2 28 21 - 32 mmol/L    Anion gap 6 3.0 - 18 mmol/L    Glucose 89 74 - 99 mg/dL    BUN 12 7.0 - 18 MG/DL    Creatinine 0.70 0.6 - 1.3 MG/DL    BUN/Creatinine ratio 17 12 - 20      GFR est AA >60 >60 ml/min/1.73m2    GFR est non-AA >60 >60 ml/min/1.73m2    Calcium 9.0 8.5 - 10.1 MG/DL   HCG URINE, QL. - POC    Collection Time: 11/30/21  8:28 AM   Result Value Ref Range    Pregnancy test,urine (POC) Positive (A) NEG     WET PREP    Collection Time: 11/30/21  9:10 AM    Specimen: Vaginal Specimen   Result Value Ref Range    Special Requests: NO SPECIAL REQUESTS      Wet prep FEW  MOTILE TRICHOMONAS NOTED        Wet prep FEW  CLUE CELLS PRESENT        Wet prep NO YEAST SEEN         Radiologic Studies -   US OB TV W DOPPLER   Final Result      1. Probable intrauterine gestational sac without evidence of yolk sac or fetal   pole.      > Dedicated clinical follow-up, trending of beta hCG levels, and repeat   ultrasound assessment to document viable IUP recommended. > Potential small area of subchorionic hemorrhage. 2. Nonvisualized right ovary. 3. Normal blood flow to the left ovary. CT Results  (Last 48 hours)    None        CXR Results  (Last 48 hours)    None          Medications given in the ED-  Medications - No data to display      Medical Decision Making   I am the first provider for this patient. I reviewed the vital signs, available nursing notes, past medical history, past surgical history, family history and social history. Vital Signs-Reviewed the patient's vital signs. Records Reviewed: Nursing Notes      Procedures:  Procedures    ED Course:  8:23 AM   Initial assessment performed.  The patients presenting problems have been discussed, and they are in agreement with the care plan formulated and outlined with them. I have encouraged them to ask questions as they arise throughout their visit. Provider Notes (Medical Decision Making): Chaitanya Goode is a 28 y.o. female with positive home pregnancy test and last menstrual period mid September presents with persistent vaginal spotting over the past month. Benign abdominal exam, hemodynamically stable. Her beta hCG is 2900 and ultrasound shows probable early gestational sac without fetal pole or yolk sac and small subchorionic hemorrhage. Concern for threatened miscarriage. She is Rh+. Will need to follow-up with OB/GYN to trend her beta hCG and repeat ultrasound to determine viability of this pregnancy. We will also treat UTI, trichomonas and BV with Macrobid and Flagyl. Follow-up on GC chlamydia cultures. Encouraged to continue prenatal vitamins and follow-up with OB/GYN and return ED if any worsening of symptoms such as heavier bleeding or pain. Diagnosis and Disposition       DISCHARGE NOTE:    Fidel March  results have been reviewed with her. She has been counseled regarding her diagnosis, treatment, and plan. She verbally conveys understanding and agreement of the signs, symptoms, diagnosis, treatment and prognosis and additionally agrees to follow up as discussed. She also agrees with the care-plan and conveys that all of her questions have been answered. I have also provided discharge instructions for her that include: educational information regarding their diagnosis and treatment, and list of reasons why they would want to return to the ED prior to their follow-up appointment, should her condition change. She has been provided with education for proper emergency department utilization. CLINICAL IMPRESSION:    1. Threatened miscarriage in early pregnancy    2. Subchorionic hemorrhage of placenta in first trimester, single or unspecified fetus    3. Acute cystitis with hematuria    4. Trichomonas vaginalis infection    5. BV (bacterial vaginosis)        PLAN:  1. D/C Home  2. Current Discharge Medication List      START taking these medications    Details   nitrofurantoin, macrocrystal-monohydrate, (Macrobid) 100 mg capsule Take 1 Capsule by mouth two (2) times a day for 7 days. Qty: 14 Capsule, Refills: 0  Start date: 11/30/2021, End date: 12/7/2021      metroNIDAZOLE (FlagyL) 500 mg tablet Take 1 Tablet by mouth two (2) times a day for 10 days. Qty: 20 Tablet, Refills: 0  Start date: 11/30/2021, End date: 12/10/2021           3. Follow-up Information     Follow up With Specialties Details Why Contact Info    Elis Esteves MD Obstetrics & Gynecology, Gynecology, Obstetrics Schedule an appointment as soon as possible for a visit in 2 days  [address]  [phone]      [facility] EMERGENCY DEPT Emergency Medicine  As needed, If symptoms worsen [address] [id number]  [phone]        _______________________________      Please note that this dictation was completed with [employer], the computer voice recognition software. Quite often unanticipated grammatical, syntax, homophones, and other interpretive errors are inadvertently transcribed by the computer software. Please disregard these errors. Please excuse any errors that have escaped final proofreading.

## 2021-11-30 NOTE — ED TRIAGE NOTES
Pt arrives ambulatory to triage with report of vaginal bleeding x1 month. Sts she had a positive home pregnancy test 11/1/2021. Reports yesterday she started experiencing lower abd cramping. Pt reports she is having small amounts of bleeding on a panty liner when she goes to the bathroom. Pt is in NAD at this time. [id number].

## 2021-12-01 LAB
C TRACH RRNA SPEC QL NAA+PROBE: NEGATIVE
N GONORRHOEA RRNA SPEC QL NAA+PROBE: NEGATIVE
SPECIMEN SOURCE: NORMAL

## 2022-01-02 ENCOUNTER — HOSPITAL ENCOUNTER (EMERGENCY)
Age: 32
Discharge: HOME | End: 2022-01-02
Attending: EMERGENCY MEDICINE
Payer: MEDICAID

## 2022-01-02 VITALS
TEMPERATURE: 97.5 F | HEART RATE: 96 BPM | DIASTOLIC BLOOD PRESSURE: 98 MMHG | SYSTOLIC BLOOD PRESSURE: 143 MMHG | OXYGEN SATURATION: 100 % | RESPIRATION RATE: 16 BRPM

## 2022-01-02 DIAGNOSIS — J06.9 VIRAL URI WITH COUGH: Primary | ICD-10-CM

## 2022-01-02 DIAGNOSIS — Z20.822 PERSON UNDER INVESTIGATION FOR COVID-19: ICD-10-CM

## 2022-01-02 DIAGNOSIS — J02.9 PHARYNGITIS, UNSPECIFIED ETIOLOGY: ICD-10-CM

## 2022-01-02 LAB — SARS-COV-2, COV2: NORMAL

## 2022-01-02 PROCEDURE — 99282 EMERGENCY DEPT VISIT SF MDM: CPT

## 2022-01-02 PROCEDURE — U0003 INFECTIOUS AGENT DETECTION BY NUCLEIC ACID (DNA OR RNA); SEVERE ACUTE RESPIRATORY SYNDROME CORONAVIRUS 2 (SARS-COV-2) (CORONAVIRUS DISEASE [COVID-19]), AMPLIFIED PROBE TECHNIQUE, MAKING USE OF HIGH THROUGHPUT TECHNOLOGIES AS DESCRIBED BY CMS-2020-01-R: HCPCS

## 2022-01-02 RX ORDER — ALBUTEROL SULFATE 90 UG/1
2 AEROSOL, METERED RESPIRATORY (INHALATION)
Qty: 18 G | Refills: 0 | Status: SHIPPED | OUTPATIENT
Start: 2022-01-02 | End: 2022-10-12 | Stop reason: ALTCHOICE

## 2022-01-02 RX ORDER — PREDNISONE 20 MG/1
60 TABLET ORAL DAILY
Qty: 15 TABLET | Refills: 0 | Status: SHIPPED | OUTPATIENT
Start: 2022-01-02 | End: 2022-01-07

## 2022-01-02 RX ORDER — BENZONATATE 100 MG/1
200 CAPSULE ORAL
Qty: 20 CAPSULE | Refills: 0 | Status: SHIPPED | OUTPATIENT
Start: 2022-01-02 | End: 2022-01-09

## 2022-01-02 NOTE — ED PROVIDER NOTES
EMERGENCY DEPARTMENT HISTORY AND PHYSICAL EXAM    Date: 2022  Patient Name: Abelino Martinez    History of Presenting Illness     Time Seen:2:25 PM    Chief Complaint   Patient presents with    Headache    Sore Throat       History Provided By: Patient    Additional History (Context): Abelino Martinez is a 28 y.o. female presents to the emergency room with complaints of \"need to be tested for Covid\". Positive nasal congestion, sore throat. Slight cough. No documented fever. Some aches. No shortness of breath or wheezing. No vomiting or diarrhea. Unknown exposure. Patient is vaccinated. She is being seen with 2 kids with similar symptoms. PCP: None    Current Outpatient Medications   Medication Sig Dispense Refill    albuterol (Proventil HFA) 90 mcg/actuation inhaler Take 2 Puffs by inhalation every four (4) hours as needed for Wheezing. 18 g 0    predniSONE (DELTASONE) 20 mg tablet Take 60 mg by mouth daily for 5 days. With Breakfast 15 Tablet 0    benzonatate (Tessalon Perles) 100 mg capsule Take 2 Capsules by mouth three (3) times daily as needed for Cough for up to 7 days.  20 Capsule 0       Past History     Past Medical History:  Past Medical History:   Diagnosis Date    Anemia     Calculus of kidney     Depression     Former smoker     Morbid obesity ([address]     Morbid obesity with BMI of 45.0-49.9, adult ([address]     Suicidal thoughts        Past Surgical History:  Past Surgical History:   Procedure Laterality Date    CYSTOSCOPY      stone extraction    HX CHOLECYSTECTOMY         Family History:  Family History   Problem Relation Age of Onset    Obesity Father        Social History:  Social History     Tobacco Use    Smoking status: Former Smoker     Packs/day: 0.25     Quit date: 2017     Years since quittin.5    Smokeless tobacco: Never Used   Substance Use Topics    Alcohol use: No     Alcohol/week: 1.7 standard drinks     Types: 2 Cans of beer per week     Comment: social pt reports seldom use can not recall last use    Drug use: Not Currently     Types: Marijuana     Comment: \" I only used it Sunday one time denies active user       Allergies:  No Known Allergies      Review of Systems   Review of Systems   Constitutional: Positive for fatigue. Negative for chills and fever. HENT: Positive for congestion and sore throat. Negative for ear discharge, ear pain, rhinorrhea, sinus pressure, sinus pain and sneezing. Respiratory: Positive for cough. Negative for shortness of breath. Gastrointestinal: Negative for abdominal pain, diarrhea and vomiting. Musculoskeletal: Negative for myalgias. Neurological: Positive for light-headedness. All other systems reviewed and are negative. Physical Exam     Vitals:    01/02/22 1419   BP: (!) 143/98   Pulse: 96   Resp: 16   Temp: 97.5 °F (36.4 °C)   SpO2: 100%     Physical Exam  Vitals and nursing note reviewed. Constitutional:       General: She is not in acute distress. Appearance: Normal appearance. She is well-developed, well-groomed and overweight. She is not ill-appearing. Comments: 64-year-old female no apparent distress. Vital signs show her to be minimally hypertensive 143/98 otherwise stable. Cooperative. HENT:      Right Ear: Tympanic membrane and ear canal normal.      Left Ear: Tympanic membrane and ear canal normal.      Nose: Congestion present. No rhinorrhea. Mouth/Throat:      Mouth: Mucous membranes are moist.      Pharynx: Oropharynx is clear. Eyes:      Extraocular Movements: Extraocular movements intact. Conjunctiva/sclera: Conjunctivae normal.      Pupils: Pupils are equal, round, and reactive to light. Cardiovascular:      Rate and Rhythm: Normal rate and regular rhythm. Heart sounds: Normal heart sounds. Pulmonary:      Effort: Pulmonary effort is normal.      Breath sounds: Normal breath sounds. Musculoskeletal:      Cervical back: Neck supple.    Lymphadenopathy: Cervical: No cervical adenopathy. Skin:     General: Skin is warm and dry. Neurological:      Mental Status: She is alert and oriented to person, place, and time. Psychiatric:         Behavior: Behavior is cooperative. Nursing note and vitals reviewed         Diagnostic Study Results     Labs -     Recent Results (from the past 12 hour(s))   SARS-COV-2    Collection Time: 01/02/22  3:04 PM   Result Value Ref Range    SARS-CoV-2 Please find results under separate order         Radiologic Studies   No orders to display     CT Results  (Last 48 hours)    None        CXR Results  (Last 48 hours)    None            Medical Decision Making   I am the first provider for this patient. I reviewed the vital signs, available nursing notes, past medical history, past surgical history, family history and social history. Vital Signs-Reviewed the patient's vital signs. Pulse Oximetry Analysis 100% on room air    Records Reviewed: Nursing Notes    DDX:  Viral URI  Covid    Provider Notes:   28 y.o. female     Procedures:  Procedures    ED Course:   Initial assessment performed. The patients presenting problems have been discussed, and they are in agreement with the care plan formulated and outlined with them. I have encouraged them to ask questions as they arise throughout their visit. ED Physician Discussion Note:   Patient swab for Covid. Pending  Do not feel sore throat is strep  Treat symptomatically  Quarantine at home until results are obtained  Follow-up with PCP  Discharge    Diagnosis and Disposition       DISCHARGE NOTE:  Kimberly Bah  results have been reviewed with her. She has been counseled regarding her diagnosis, treatment, and plan. She verbally conveys understanding and agreement of the signs, symptoms, diagnosis, treatment and prognosis and additionally agrees to follow up as discussed.   She also agrees with the care-plan and conveys that all of her questions have been answered. I have also provided discharge instructions for her that include: educational information regarding their diagnosis and treatment, and list of reasons why they would want to return to the ED prior to their follow-up appointment, should her condition change. She has been provided with education for proper emergency department utilization. CLINICAL IMPRESSION:    1. Viral URI with cough    2. Pharyngitis, unspecified etiology    3. Person under investigation for COVID-19        PLAN:  1. D/C Home  2. Discharge Medication List as of 1/2/2022  2:57 PM      START taking these medications    Details   albuterol (Proventil HFA) 90 mcg/actuation inhaler Take 2 Puffs by inhalation every four (4) hours as needed for Wheezing., Normal, Disp-18 g, R-0      predniSONE (DELTASONE) 20 mg tablet Take 60 mg by mouth daily for 5 days. With Breakfast, Normal, Disp-15 Tablet, R-0      benzonatate (Tessalon Perles) 100 mg capsule Take 2 Capsules by mouth three (3) times daily as needed for Cough for up to 7 days. , Normal, Disp-20 Capsule, R-0           3. Follow-up Information     Follow up With Specialties Details Why Contact Info    Primary care provider  Call  Call for follow-up care     [facility] EMERGENCY DEPT Emergency Medicine  If symptoms worsen, As needed [address] [id number]  [phone]        ____________________________________     Please note that this dictation was completed with [employer], the computer voice recognition software. Quite often unanticipated grammatical, syntax, homophones, and other interpretive errors are inadvertently transcribed by the computer software. Please disregard these errors. Please excuse any errors that have escaped final proofreading.

## 2022-01-02 NOTE — ED TRIAGE NOTES
Pt arrives ambulatory to triage with report of headache and sore throat. NAD. Denies exposure. Vaccinated.

## 2022-01-03 LAB — SARS-COV-2, NAA: DETECTED

## 2022-03-03 NOTE — DISCHARGE INSTRUCTIONS
Tylenol/Motrin for fever aches  Push liquids keep well-hydrated  Over-the-counter cold medication  Medication as prescribed for cough  Covid swab is pending. Results should come back in 24 to 72 hours.   Check your [employer] account for results  Rest  Follow-up with your PCP
no

## 2022-10-12 ENCOUNTER — HOSPITAL ENCOUNTER (OUTPATIENT)
Dept: NON INVASIVE DIAGNOSTICS | Age: 32
Discharge: HOME | End: 2022-10-12

## 2022-10-12 ENCOUNTER — OFFICE VISIT (OUTPATIENT)
Dept: SURGERY | Age: 32
End: 2022-10-12
Payer: MEDICAID

## 2022-10-12 ENCOUNTER — TRANSCRIBE ORDER (OUTPATIENT)
Dept: REGISTRATION | Age: 32
End: 2022-10-12

## 2022-10-12 ENCOUNTER — HOSPITAL ENCOUNTER (OUTPATIENT)
Dept: LAB | Age: 32
Discharge: HOME | End: 2022-10-12

## 2022-10-12 VITALS
OXYGEN SATURATION: 100 % | HEART RATE: 90 BPM | HEIGHT: 64 IN | TEMPERATURE: 97.3 F | WEIGHT: 254.9 LBS | BODY MASS INDEX: 43.52 KG/M2 | SYSTOLIC BLOOD PRESSURE: 151 MMHG | DIASTOLIC BLOOD PRESSURE: 98 MMHG | RESPIRATION RATE: 16 BRPM

## 2022-10-12 DIAGNOSIS — K30 FUNCTIONAL DYSPEPSIA: ICD-10-CM

## 2022-10-12 DIAGNOSIS — F12.90 MARIJUANA USE: ICD-10-CM

## 2022-10-12 DIAGNOSIS — E66.01 MORBID OBESITY (HCC): ICD-10-CM

## 2022-10-12 DIAGNOSIS — F32.A DEPRESSION, UNSPECIFIED DEPRESSION TYPE: ICD-10-CM

## 2022-10-12 DIAGNOSIS — E66.01 MORBID OBESITY WITH BODY MASS INDEX (BMI) OF 40.0 TO 49.9 (HCC): Primary | ICD-10-CM

## 2022-10-12 DIAGNOSIS — Z01.818 PRE-OP EVALUATION: ICD-10-CM

## 2022-10-12 DIAGNOSIS — Z87.891 FORMER SMOKER: ICD-10-CM

## 2022-10-12 DIAGNOSIS — D50.9 IRON DEFICIENCY ANEMIA, UNSPECIFIED IRON DEFICIENCY ANEMIA TYPE: ICD-10-CM

## 2022-10-12 DIAGNOSIS — E66.01 MORBID OBESITY (HCC): Primary | ICD-10-CM

## 2022-10-12 PROBLEM — F17.200 SMOKER: Status: ACTIVE | Noted: 2022-10-12

## 2022-10-12 LAB
ATRIAL RATE: 79 BPM
CALCULATED P AXIS, ECG09: 83 DEGREES
CALCULATED R AXIS, ECG10: 83 DEGREES
CALCULATED T AXIS, ECG11: 72 DEGREES
DIAGNOSIS, 93000: NORMAL
P-R INTERVAL, ECG05: 160 MS
Q-T INTERVAL, ECG07: 368 MS
QRS DURATION, ECG06: 74 MS
QTC CALCULATION (BEZET), ECG08: 421 MS
VENTRICULAR RATE, ECG03: 79 BPM
XX-LABCORP SPECIMEN COL,LCBCF: NORMAL

## 2022-10-12 PROCEDURE — 99214 OFFICE O/P EST MOD 30 MIN: CPT | Performed by: SPECIALIST

## 2022-10-12 PROCEDURE — 99407 BEHAV CHNG SMOKING > 10 MIN: CPT | Performed by: SPECIALIST

## 2022-10-12 PROCEDURE — 99001 SPECIMEN HANDLING PT-LAB: CPT

## 2022-10-12 PROCEDURE — 93005 ELECTROCARDIOGRAM TRACING: CPT

## 2022-10-12 NOTE — PROGRESS NOTES
Bariatric Surgery Consultation  Restart from Summer 2020 (very little progress)    Subjective: The patient is a 33 y.o. obese female with a Body mass index is 43.75 kg/m². .  The patient is currently her heaviest weight for the past several years. she has been overweight since her teen years. she has been considering surgery since last year. she desires surgery at this time because of multiple health concerns and their lifestyle issues which are hindered by their weight. she has been referred by his family physician for evaluation and treatment of their obesity via surgical intervention. Albert Bennett has tried multiple diets in her lifetime most recently tried behavior modification and unsupervised diets    Bariatric comorbidities present are   Patient Active Problem List   Diagnosis Code    Morbid obesity ([facility] [address] E66.01    Depression F32. A    Anemia D64.9    Former smoker Z87.891    Calculus of kidney N20.0    Morbid obesity with BMI of 45.0-49.9, adult ([facility] [address] E66.01, Z68.42    Morbid obesity with body mass index (BMI) of 40.0 to 49.9 ([facility]) E66.01    Smoker F17.200    Marijuana use F12.90    Functional dyspepsia K30       The patient is considering laparoscopic sleeve gastrectomy for surgical weight loss due to their ineffective progress with medical forms of weight loss and the urging of their physician who cares for their primary medical issues. The patient  now presents  for consideration for weight loss surgery understanding the benefits of this over a medical approach of weight loss as was discussed in our presentation on weight loss surgery. They have discussed their plans both with their family and primary care physician who is in support of their pursuit of such. The patient has had no health issues as of late and denies and gastrointestinal disturbances other than what is outlined below in their review of symptoms.  All of their prior evaluations available by both their PCP's and specialists physicians have been reviewed today either in the Care Everywhere portal or scanned under the media tab. I have spent a large portion of my initial consultation today reviewing the patients current dietary habits which have contributed to their health issues and obesity. I have suggested to them personally a dietary regimen that they can initiate now to help with their status as it pertains to their weight. They understand that the most important aspect of their journey through their weight loss endeavor will be their adherence to a new lifestyle of healthy eating behavior. They also understand that an adherence to an exercise program will not only help with weight loss but is ultimately important in weight maintenance. The patients goal weight is 157 lb.      Patient Active Problem List    Diagnosis Date Noted    Morbid obesity with body mass index (BMI) of 40.0 to 49.9 ([facility] [address] 10/12/2022    Smoker 10/12/2022    Marijuana use 10/12/2022    Functional dyspepsia 10/12/2022    Anemia     Former smoker     Calculus of kidney     Morbid obesity with BMI of 45.0-49.9, adult ([facility] [address]     Depression 12/16/2015    Morbid obesity ([facility] [address] 04/16/2013     Past Surgical History:   Procedure Laterality Date    CYSTOSCOPY      stone extraction    HX CHOLECYSTECTOMY        Social History     Tobacco Use    Smoking status: Some Days     Types: Cigarettes    Smokeless tobacco: Never   Substance Use Topics    Alcohol use: No     Alcohol/week: 1.7 standard drinks     Types: 2 Cans of beer per week     Comment: social pt reports seldom use can not recall last use      Family History   Problem Relation Age of Onset    Obesity Father         No Known Allergies     Review of Systems:     General - No history or complaints of unexpected fever, chills, or weight loss  Head/Neck - No history or complaints of headache, diplopia, dysphagia, hearing loss  Cardiac - No history or complaints of chest pain, palpitations, murmur, or shortness of breath  Pulmonary - No history or complaints of shortness of breath, productive cough, hemoptysis  Gastrointestinal - (+) reflux, no abdominal pain, obstipation/constipation or blood per rectum  Genitourinary - No history or complaints of hematuria/dysuria, stress urinary incontinence symptoms, or renal lithiasis  Musculoskeletal - mild joint pain in their knees,  no muscular weakness  Hematologic - No history or complaints of bleeding disorders,  No blood transfusions  Neurologic - No history or complaints of  migraine headaches, seizure activity, syncopal episodes, TIA or stroke  Integumentary - No history or complaints of rashes, abnormal nevi, skin cancer  Gynecological - unremarkable     Objective:     Visit Vitals  BP (!) 151/98 (BP 1 Location: Right upper arm, BP Patient Position: Sitting)   Pulse 90   Temp 97.3 °F (36.3 °C)   Resp 16   Ht 5' 4\" (1.626 m)   Wt 115.6 kg (254 lb 14.4 oz)   SpO2 100%   BMI 43.75 kg/m²     Physical Examination: General appearance - alert, well appearing, and in no distress  Mental status - alert, oriented to person, place, and time  Eyes - pupils equal and reactive, extraocular eye movements intact  Ears - external ear canals normal  Nose - normal and patent, no erythema, discharge or polyps  Mouth - mucous membranes moist, pharynx normal without lesions  Neck - supple, no significant adenopathy  Lymphatics - no palpable lymphadenopathy, no hepatosplenomegaly  Chest - clear to auscultation, no wheezes, rales or rhonchi, symmetric air entry  Heart - normal rate, regular rhythm, normal S1, S2, no murmurs, rubs, clicks or gallops  Abdomen - soft, nontender, nondistended, no masses or organomegaly  Back exam - full range of motion, no tenderness, palpable spasm or pain on motion  Neurological - alert, oriented, normal speech, no focal findings or movement disorder noted  Musculoskeletal - no joint tenderness, deformity or swelling  Extremities - peripheral pulses normal, no pedal edema, no clubbing or cyanosis  Skin - normal coloration and turgor, no rashes, no suspicious skin lesions noted    Labs:       No results found for this or any previous visit (from the past 1440 hour(s)). Assessment:     Morbid obesity with comorbidity    Plan:     laparoscopic sleeve gastrectomy    This is a 33 y.o. female with a BMI of Body mass index is 43.75 kg/m². and the weight-related co-morbidties as noted below. Poly Peña meets the NIH criteria for bariatric surgery based upon the BMI of Body mass index is 43.75 kg/m². and multiple weight-related co-morbidties. Poly Peña has elected laparoscopic sleeve gastrectomy as her intervention of choice for treatment of morbid obestiy through surgical means secondary to its safety profile, rapid return to work  and decreases in operative risks over gastric bypass. In the office today, following Alexandru's history and physical examination, a 30 minute discussion regarding the anatomic alterations for the laparoscopic sleeve gastrectomy was undertaken. The dietary expectations and the patient and physician dependent factors for success were thoroughly discussed, to include the need for interval follow-up and long-term dietary changes associated with success. The possible complications of the sleeve gastrectomy  were also discussed, to include;death, DVT/PE, staple line leak, bleeding, stricture formation, infection, nutritional deficiencies and sleeve dilation. Specific weight related outcomes for success were also discussed with an emphasis on careful and close follow-up with the first year and eating behavior modification as the baseline and cyclical hunger return. The patient expressed an understanding of the above factors, and her questions were answered in their entirety. In addition, the patient attended a 1.5 hour power point seminar regarding obesity, surgical weight loss including, adjustable gastric band, gastric bypass, and sleeve gastrectomy.   This discussion contrasted the different surgical techniques, mechanisms of actions and expected outcomes, and surgical and medical risks associated with each procedure. During this seminar, there was a long question and answer session where each questions was answered until there were no additional questions. Today, the patient had all of her questions answered and desires to proceed with  bariatric surgery initially choosing sleeve gastrectomy as her surgical option. She understands the need to cease all smoking and THC use as of today even though her usage is very minimal    Full consult labs and EKG ordered today    Her UGI in 2020 was normal - no need to repeat UGI as she has no issues with reflux    She will meet with office staff today to discuss criteria    Secondary Diagnoses:     Dietary Intervention  - The patient is currently scheduled to see or has been followed by a bariatric nutritionist for an attempt at preoperative weight loss as has been dictated by their insurance carrier. They will be assessed at various times during their follow up to evaluate their progress depending on the length of time that is required once again by their carrier. I have explained the importance of preoperative weight loss and the benefits regarding lower surgical risk and also assisting the patient in reaching their weight loss goal.  Finally they understand their is a physiologic benefit from the standpoint of hepatic volume reduction preoperatively. I have reiterated the importance of a low carbohydrate and high protein regimen to achieve their stated goal.     GERD -The patient understands that weight loss surgery is not a guaranteed cure for reflux disease but does understand the benefits that weight loss can have on reflux disease. They also understand that at the time of surgery the gastroesophageal junction will be evaluated for the presence of a diaphragmatic hernia.   Hernias will be corrected always with the gastric band and sleeve gastrectomy procedures, but only on a case by case basis with the gastric bypass if it prevents our ability to perform the operation at hand, or if I feel that they would benefit long term with correction of this issue. The patient also understands that neither weight loss surgery nor repair of a diaphragmatic hernia repair guarantees the complete cessation of the disease. They also understand there is a possibility of recurrence with a simple crural repair as is performed with these procedures. They understand they may have to continue their medications in the postoperative period. They have a good understanding that the gastric bypass procedure is better suited to total resolution of this issue and that neither the Lap Band nor sleeve gastrectomy is considered a curative procedure as it pertains to this diagnosis. Smoking Cessation - Today I have counseled the patient extensively regarding smoking cessation for greater than 10 minutes. They have been counseled extensively about the detrimental effects of smoking on their weight loss surgical procedure particularly for the gastric bypass and sleeve gastrectomy procedures. They understand that smoking leads to pulmonary issues postoperatively and can lead to gastric ulcers and marginal ulcers in the post bariatric surgery pouch that has been created. They understand that they must stop smoking prior to surgery or it may affect their ultimate progression to their procedure. They understand finally that labs may be obtained to prove that they have ceased smoking prior to surgery. Total time counseling was greater than 10 minutes.      Signed By: CASYS Almeida     October 12, 2022

## 2022-10-12 NOTE — PROGRESS NOTES
1. \"Have you been to the ER, urgent care clinic since your last visit? Hospitalized since your last visit? \" No    2. \"Have you seen or consulted any other health care providers outside of the [address] [place] [address] since your last visit? \" No     3. For patients aged 39-70: Has the patient had a colonoscopy / FIT/ Cologuard? NA - based on age      If the patient is female:    4. For patients aged 41-77: Has the patient had a mammogram within the past 2 years? NA - based on age or sex      11. For patients aged 21-65: Has the patient had a pap smear?  Yes - no Care Gap present

## 2022-10-12 NOTE — PATIENT INSTRUCTIONS
Body Mass Index: Care Instructions  Your Care Instructions    Body mass index (BMI) can help you see if your weight is raising your risk for health problems. It uses a formula to compare how much you weigh with how tall you are. A BMI lower than 18.5 is considered underweight. A BMI between 18.5 and 24.9 is considered healthy. A BMI between 25 and 29.9 is considered overweight. A BMI of 30 or higher is considered obese. If your BMI is in the normal range, it means that you have a lower risk for weight-related health problems. If your BMI is in the overweight or obese range, you may be at increased risk for weight-related health problems, such as high blood pressure, heart disease, stroke, arthritis or joint pain, and diabetes. If your BMI is in the underweight range, you may be at increased risk for health problems such as fatigue, lower protection (immunity) against illness, muscle loss, bone loss, hair loss, and hormone problems. BMI is just one measure of your risk for weight-related health problems. You may be at higher risk for health problems if you are not active, you eat an unhealthy diet, or you drink too much alcohol or use tobacco products. Follow-up care is a key part of your treatment and safety. Be sure to make and go to all appointments, and call your doctor if you are having problems. It's also a good idea to know your test results and keep a list of the medicines you take. How can you care for yourself at home? Practice healthy eating habits. This includes eating plenty of fruits, vegetables, whole grains, lean protein, and low-fat dairy. If your doctor recommends it, get more exercise. Walking is a good choice. Bit by bit, increase the amount you walk every day. Try for at least 30 minutes on most days of the week. Do not smoke. Smoking can increase your risk for health problems. If you need help quitting, talk to your doctor about stop-smoking programs and medicines.  These can increase your chances of quitting for good. Limit alcohol to 2 drinks a day for men and 1 drink a day for women. Too much alcohol can cause health problems. If you have a BMI higher than 25  Your doctor may do other tests to check your risk for weight-related health problems. This may include measuring the distance around your waist. A waist measurement of more than 40 inches in men or 35 inches in women can increase the risk of weight-related health problems. Talk with your doctor about steps you can take to stay healthy or improve your health. You may need to make lifestyle changes to lose weight and stay healthy, such as changing your diet and getting regular exercise. If you have a BMI lower than 18.5  Your doctor may do other tests to check your risk for health problems. Talk with your doctor about steps you can take to stay healthy or improve your health. You may need to make lifestyle changes to gain or maintain weight and stay healthy, such as getting more healthy foods in your diet and doing exercises to build muscle. Where can you learn more? Go to http://www.gray.com/. Enter S176 in the search box to learn more about \"Body Mass Index: Care Instructions. \"  Current as of: June 26, 2018  Content Version: 11.8  © 6677-2715 [employer]. Care instructions adapted under license by [employer] (which disclaims liability or warranty for this information). If you have questions about a medical condition or this instruction, always ask your healthcare professional. [place] [address] any warranty or liability for your use of this information.

## 2022-10-13 LAB
ALBUMIN SERPL-MCNC: 4.3 G/DL (ref 3.8–4.8)
ALBUMIN/GLOB SERPL: 1.3 {RATIO} (ref 1.2–2.2)
ALP SERPL-CCNC: 84 IU/L (ref 44–121)
ALT SERPL-CCNC: 10 IU/L (ref 0–32)
AST SERPL-CCNC: 13 IU/L (ref 0–40)
BILIRUB SERPL-MCNC: 0.3 MG/DL (ref 0–1.2)
BUN SERPL-MCNC: 13 MG/DL (ref 6–20)
BUN/CREAT SERPL: 18 (ref 9–23)
CALCIUM SERPL-MCNC: 9.2 MG/DL (ref 8.7–10.2)
CHLORIDE SERPL-SCNC: 105 MMOL/L (ref 96–106)
CO2 SERPL-SCNC: 20 MMOL/L (ref 20–29)
CREAT SERPL-MCNC: 0.73 MG/DL (ref 0.57–1)
EGFR: 113 ML/MIN/1.73
ERYTHROCYTE [DISTWIDTH] IN BLOOD BY AUTOMATED COUNT: 14.9 % (ref 11.7–15.4)
FERRITIN SERPL-MCNC: 13 NG/ML (ref 15–150)
GLOBULIN SER CALC-MCNC: 3.2 G/DL (ref 1.5–4.5)
GLUCOSE SERPL-MCNC: 87 MG/DL (ref 70–99)
HCT VFR BLD AUTO: 35.5 % (ref 34–46.6)
HGB BLD-MCNC: 11.8 G/DL (ref 11.1–15.9)
IRON SERPL-MCNC: 181 UG/DL (ref 27–159)
MCH RBC QN AUTO: 28.2 PG (ref 26.6–33)
MCHC RBC AUTO-ENTMCNC: 33.2 G/DL (ref 31.5–35.7)
MCV RBC AUTO: 85 FL (ref 79–97)
PLATELET # BLD AUTO: 421 X10E3/UL (ref 150–450)
POTASSIUM SERPL-SCNC: 4.3 MMOL/L (ref 3.5–5.2)
PROT SERPL-MCNC: 7.5 G/DL (ref 6–8.5)
RBC # BLD AUTO: 4.18 X10E6/UL (ref 3.77–5.28)
SODIUM SERPL-SCNC: 139 MMOL/L (ref 134–144)
T4 FREE SERPL-MCNC: 1.13 NG/DL (ref 0.82–1.77)
TSH SERPL DL<=0.005 MIU/L-ACNC: 0.82 UIU/ML (ref 0.45–4.5)
WBC # BLD AUTO: 9.1 X10E3/UL (ref 3.4–10.8)

## 2022-11-09 ENCOUNTER — HOSPITAL ENCOUNTER (OUTPATIENT)
Dept: BARIATRICS/WEIGHT MGMT | Age: 32
Discharge: HOME | End: 2022-11-09

## 2022-11-09 VITALS — BODY MASS INDEX: 43.54 KG/M2 | HEIGHT: 64 IN | WEIGHT: 255 LBS

## 2022-11-09 NOTE — PROGRESS NOTES
Medical Weight Loss Multi-Disciplinary Program    Patient's Name: Emily Nath Age: 33 y.o. YOB: 1990 Sex: female     Session #1. Pt attended in-person class. Weight obtained in office. Date: 11/9/2022     Visit Vitals  Ht 5' 4\" (1.626 m)   Wt 115.7 kg (255 lb)   BMI 43.77 kg/m²       Pounds Lost since last month: N/A Pounds Gained since last month: N/A       Starting Weight (Initial Consult/Seminar, 10/12/2022): 254.9 lbs Previous Months Weight: N/A   Overall Pounds Lost: 0.0 lbs  Overall Pounds Gained: 0.1 lbs     Do you smoke? Yes, \"occasionally\"    Alcohol intake:  Number of drinks per week:  0    Class Guidelines    Guidelines are reviewed with patient at the start of every class. 1. Patient understands that weight loss trial classes must be consecutive. Patient understands if they miss a class, it is their responsibility to contact me to reschedule class. I will reach out to patient after their first no show. 2.  Patient understands the expectations that weight maintenance/weight loss is expected during the classes. Failure to demonstrate changes may result in extension of weight loss trial, followed by returning to see the surgeon. Patient understands that they CANNOT gain any weight during the weight loss trial.  Gaining weight will result in extension of weight loss trial.  3. Patient is also instructed to complete their labwork, psychological evaluation visit, and any other tests that the surgeon has ordered while they are working on their weight loss trial.  4.  Patient was instructed to bring their packet of nutrition education materials to every class and appointment. Eating Habits and Behaviors    Reviewed intake  Understanding low carbohydrates, low sugar, higher protein meals  Instruction given for personal dietary changes  Discussed perceived compliance    Comments: RD Reviewed Diet History and Physical Activity/Exercise habits.   Recommended dietary changes discussed for both before and after surgery. Today in class we reviewed the Key Diet Principles. Patient was encouraged to consume 3 meals each day, and meal timing was reviewed. Meal time behaviors that will help pt to be successful with their weight loss efforts were also discussed. We discussed the importance of drinking adequate amounts of fluids, recommending that patient consume a minimum of 64 oz of sugar-free, caffeine-free and carbonation-free fluids each day. Patient was encouraged to eliminate sugar-sweetened beverages such as sweet tea, fruit juice, fruit smoothies, flavored coffee drinks and regular sodas. During the weight loss trial, patient is encouraged to focus on eating protein-forward meals, with a daily goal of  grams protein. Patient was also advised to decrease carbohydrate intake to <100 g/d, choosing complex vs. simple carbohydrates in limited amounts. We also discussed limiting fat intake. Encouraged patient to follow the \"3-gram rule\" when choosing foods by looking for items containing <3 g of fat and sugar per serving. We reviewed meal planning guidelines and discussed appropriate meal and snack options. We also talked about appropriate protein drinks and patient was encouraged to start trying these, using them either for a meal replacement or a protein-rich snack pre-operatively. We reviewed the nutritional guidelines for selecting protein shakes. Pre-operative vitamin and mineral supplementation was reviewed. Patient was instructed to choose a chewable complete multivitamin with iron in NON-gummy form. Selection of probiotic was also reviewed. Comments: During today's class we talked about the role of carbohydrates. Patient was instructed on: The function of carbohydrates. Foods that are carbohydrate-heavy. Patient was given the guidelines to keep their carbohydrates less than 50-75 grams per day in the pre-op phase.   Patient was also given ideas of low carb swaps, which include zucchini noodles, spaghetti squash, or cauliflower rice. Discussed lower carb swaps to use instead of potato chips. Patient's current diet habits include:  Patient is eating 3 meals and 2 snacks per day. Per dietary recall, pts diet has the following concerns:  Pt is consuming 6 oz sugar-sweetened beverages/day. Pt is consuming foods high in fat such as: turkey carlin, mayonnaise, guacamole, Chex mix, and cheese. [place] Pt is consuming foods high in carbohydrates, such as: bread, Chex mix, dried fruit, fresh fruit, fruit juice, cheese, crackers, rice, pasta, and corn. Pt has found 1 protein supplement shakes for use post-op in meeting their protein needs. Patient's plan for dietary and/or behavior changes in the upcoming month: none noted. Physical Activity/Exercise    Comments: We talked about exercise. Patient was given reasons of why exercise is so important and how that can help with their long-term success. I have encouraged patient to get a support system to help with the activity. We talked about recommended types of physical activity, including walking, swimming, cycling, or chair exercises. I also talked with patient about doing some strength training, which helps the metabolism, as well as strengthen muscles and bones. Patient's plan to incorporate more activity includes: \"Instead of sitting during my lunch break & using my phone, I will walk outside & use my phone; I will walk to the [employer] bus stop instead of driving\". Behavior Modification     Comments:  During today's class, I discussed vitamin and mineral supplementation essential for health and prevention of malnutrition in depth. Patient was directed to the handouts on vitamins and minerals found on pages 27-33 that were provided in class handouts packet as well as a handout titled, \"Nutrient Deficiency and it's symptoms\".   I reviewed the vitamins and minerals that need to be supplemented, dosage recommendations, functions of supplements and signs of deficiencies, as well as examples of specific supplements. Reviewed briefly the requirement  differences between laparoscopic gastric bypass, laparoscopic sleeve gastrectomy, and lap-band procedures, while encouraging all patients to continue supplements for life no matter what bariatric surgery they are preparing for. Goals: Work to increase to 3-4 small meals per day, with 1-2 planned snacks as needed. Recommend following the plate method for meal planning - focusing on lean protein, non-starchy vegetables, and measured amounts of complex carbohydrates. - Goal of  g protein and <100 g carbohydrates per day. - Select at least 2 DIFFERENT protein supplements that can be used for protein supplementation to meet goals pre- and post-operatively. - Practice Carbohydrate Counting and label reading.   - Follow 3 g rule for fat and sugar. 2. Slow down meals  - Chew each bite 25-35 times. - Aim for 20-30 min/meal.  3. Increase non-caloric fluids to 64 oz per day. Eliminate caffeine, added sugar, carbonation, and straws.               - Continue to work to decrease sugar sweetened beverages - goal of calorie-free beverages only. - Must eliminate caffeine prior to surgery and avoid for ~6-8 weeks. - Practice 30:30 rule,  food and fluid intake. 4. Start activity regimen, work to increase ADLs. 5. Start Complete MVI and probiotic at least 30 days pre-op. Candidate for surgery (per RD): PENDING, Pt scheduled for nutrition education on 12/6/2022.

## 2022-12-06 ENCOUNTER — HOSPITAL ENCOUNTER (OUTPATIENT)
Dept: BARIATRICS/WEIGHT MGMT | Age: 32
Discharge: HOME | End: 2022-12-06

## 2022-12-07 VITALS — BODY MASS INDEX: 43.71 KG/M2 | WEIGHT: 256 LBS | HEIGHT: 64 IN

## 2022-12-08 ENCOUNTER — APPOINTMENT (OUTPATIENT)
Dept: GENERAL RADIOLOGY | Age: 32
End: 2022-12-08
Attending: PHYSICIAN ASSISTANT
Payer: MEDICAID

## 2022-12-08 ENCOUNTER — HOSPITAL ENCOUNTER (EMERGENCY)
Age: 32
Discharge: HOME | End: 2022-12-08
Attending: EMERGENCY MEDICINE
Payer: MEDICAID

## 2022-12-08 VITALS
TEMPERATURE: 98.8 F | RESPIRATION RATE: 16 BRPM | HEIGHT: 63 IN | SYSTOLIC BLOOD PRESSURE: 154 MMHG | BODY MASS INDEX: 45.18 KG/M2 | HEART RATE: 66 BPM | OXYGEN SATURATION: 100 % | DIASTOLIC BLOOD PRESSURE: 116 MMHG | WEIGHT: 255 LBS

## 2022-12-08 DIAGNOSIS — S76.019A HIP STRAIN, INITIAL ENCOUNTER: Primary | ICD-10-CM

## 2022-12-08 PROCEDURE — 99283 EMERGENCY DEPT VISIT LOW MDM: CPT

## 2022-12-08 PROCEDURE — 73502 X-RAY EXAM HIP UNI 2-3 VIEWS: CPT

## 2022-12-08 NOTE — ED TRIAGE NOTES
Patient reports she was walking last night and fell into a hole at the apartment complex and injured her right leg all the way up to her groin

## 2022-12-08 NOTE — ED PROVIDER NOTES
EMERGENCY DEPARTMENT HISTORY AND PHYSICAL EXAM    Date: 12/8/2022  Patient Name: Suzanna Phillips    History of Presenting Illness     No chief complaint on file. History Provided By: Patient    11:51 AM  Suzanna Phillips is a 33 y.o. female who presents to the emergency department C/O right hip/groin pain. Patient states she was walking in her apartment complex yesterday when she stepped in a hole in the ground that was covered with leaves. She states her right foot went to the whole and she fell backwards. She felt okay last night but this morning had progressively worsening pain in her right groin. She is ambulatory but with pain. She did hit her head but did not lose consciousness and denies headache neck pain or back pain today. Pt denies extremity numbness or weakness, and any other sxs or complaints. PCP: None        Past History     Past Medical History:  Past Medical History:   Diagnosis Date    Anemia     Calculus of kidney     history of    Depression     Functional dyspepsia     avoids trigger foods    Marijuana use     occasional    Morbid obesity ([address]     Morbid obesity with body mass index (BMI) of 40.0 to 49.9 ([facility])     Smoker     social smoker when with friends       Past Surgical History:  Past Surgical History:   Procedure Laterality Date    CYSTOSCOPY      stone extraction    HX CHOLECYSTECTOMY         Family History:  Family History   Problem Relation Age of Onset    Obesity Father        Social History:  Social History     Tobacco Use    Smoking status: Some Days     Types: Cigarettes    Smokeless tobacco: Never   Vaping Use    Vaping Use: Never used   Substance Use Topics    Alcohol use: No     Alcohol/week: 1.7 standard drinks     Types: 2 Cans of beer per week     Comment: social pt reports seldom use can not recall last use    Drug use: Not Currently     Types: Marijuana       Allergies:  No Known Allergies      Review of Systems   Review of Systems   Constitutional: Negative. Musculoskeletal:  Positive for arthralgias and myalgias. Negative for joint swelling. Skin: Negative. Neurological:  Negative for syncope, weakness, numbness and headaches. All other systems reviewed and are negative. Physical Exam     Vitals:    12/08/22 0930   BP: (!) 154/116   Pulse: 66   Resp: 16   Temp: 98.8 °F (37.1 °C)   SpO2: 100%   Weight: 115.7 kg (255 lb)   Height: 5' 3\" (1.6 m)     Physical Exam  Vitals and nursing note reviewed. Constitutional:       General: She is not in acute distress. Appearance: Normal appearance. She is normal weight. HENT:      Head: Normocephalic and atraumatic. Musculoskeletal:      Thoracic back: Normal.      Lumbar back: Normal.        Legs:    Neurological:      Mental Status: She is alert. Diagnostic Study Results     Labs -   No results found for this or any previous visit (from the past 12 hour(s)). Radiologic Studies -   XR HIP RT W OR WO PELV 2-3 VWS   Final Result      No evidence of acute fracture or dislocation. CT Results  (Last 48 hours)      None          CXR Results  (Last 48 hours)      None            Medications given in the ED-  Medications - No data to display      Medical Decision Making   I am the first provider for this patient. I reviewed the vital signs, available nursing notes, past medical history, past surgical history, family history and social history. Vital Signs-Reviewed the patient's vital signs. Records Reviewed: Nursing Notes      Procedures:  Procedures    ED Course:  11:51 AM   Initial assessment performed. The patients presenting problems have been discussed, and they are in agreement with the care plan formulated and outlined with them. I have encouraged them to ask questions as they arise throughout their visit. Provider Notes (Medical Decision Making): Carloz Chacko is a 33 y.o. female with right hip/groin pain after stepping and falling in a hole in the grass last night. She is neurovascular intact with tenderness in the right groin and hip. X-ray of the right hip and pelvis shows no acute abnormalities. Consistent with muscle strain. Will give crutches for comfort, Tylenol and ibuprofen for pain and follow-up with orthopedist if not significantly improved after 1 to 2 weeks. Diagnosis and Disposition       DISCHARGE NOTE:    Kimberly Bah  results have been reviewed with her. She has been counseled regarding her diagnosis, treatment, and plan. She verbally conveys understanding and agreement of the signs, symptoms, diagnosis, treatment and prognosis and additionally agrees to follow up as discussed. She also agrees with the care-plan and conveys that all of her questions have been answered. I have also provided discharge instructions for her that include: educational information regarding their diagnosis and treatment, and list of reasons why they would want to return to the ED prior to their follow-up appointment, should her condition change. She has been provided with education for proper emergency department utilization. CLINICAL IMPRESSION:    1. Hip strain, initial encounter        PLAN:  1. D/C Home  2. There are no discharge medications for this patient. 3.   Follow-up Information       Follow up With Specialties Details Why Contact Dulce Maria Velasco, DO Orthopedic Surgery In 1 week As needed [address] [place] [address] [phone]      [facility] EMERGENCY DEPT Emergency Medicine  As needed, If symptoms worsen [address] [id number]  [phone]          _______________________________      Please note that this dictation was completed with [email or web address], the [employer] voice recognition software. Quite often unanticipated grammatical, syntax, homophones, and other interpretive errors are inadvertently transcribed by the computer software. Please disregard these errors.   Please excuse any errors that have escaped final proofreading.

## 2023-01-10 ENCOUNTER — HOSPITAL ENCOUNTER (OUTPATIENT)
Dept: BARIATRICS/WEIGHT MGMT | Age: 33
Discharge: HOME | End: 2023-01-10

## 2023-01-10 VITALS — BODY MASS INDEX: 46.03 KG/M2 | HEIGHT: 63 IN | WEIGHT: 259.8 LBS

## 2023-01-10 NOTE — PROGRESS NOTES
Medical Weight Loss Multi-Disciplinary Program    Patient's Name: Angela Valenzuela Age: 33 y.o. YOB: 1990 Sex: female     Session #3. Pt attended in-person class. Weight obtained in office. Date: 1/10/2023    Visit Vitals  Ht 5' 3\" (1.6 m)   Wt 117.8 kg (259 lb 12.8 oz)   BMI 46.02 kg/m²       Pounds Lost since last month: 0.0 lbs Pounds Gained since last month: 4.8 lbs       Starting Weight: 254.9 lbs Previous Months Weight: 255 lbs   Overall Pounds Lost: 0.0 lbs  Overall Pounds Gained: 4.9 lbs     Do you smoke? no    Alcohol intake:  Number of drinks per week: 0    Class Guidelines    Guidelines are reviewed with patient at the start of every class. 1. Patient understands that weight loss trial classes must be consecutive. Patient understands if they miss a class, it is their responsibility to contact me to reschedule class. I will reach out to patient after their first no show. 2.  Patient understands the expectations that weight maintenance/weight loss is expected during the classes. Failure to demonstrate changes may result in extension of weight loss trial, followed by returning to see the surgeon. Patient understands that they CANNOT gain any weight during the weight loss trial.  Gaining weight will result in extension of weight loss trial.  3. Patient is also instructed to complete their labwork, psychological evaluation visit, and any other tests that the surgeon has ordered while they are working on their weight loss trial.  4.  Patient was instructed to bring their packet of nutrition education materials to every class and appointment. Eating Habits and Behaviors    Reviewed intake  Understanding low-carbohydrate/low-sugar/low-fat, higher protein meals  Instruction given for personal dietary changes  Discussed perceived compliance    Comments: RD Reviewed Diet History and Physical Activity/Exercise habits.   Recommended dietary changes discussed for both before and after surgery. Today in class we reviewed the Key Diet Principles. Patient was encouraged to consume 3 meals each day, and meal timing was reviewed. Meal time behaviors that will help pt to be successful with their weight loss efforts were also discussed. We discussed the importance of drinking adequate amounts of fluids, recommending that patient consume a minimum of 64 oz of sugar-free, caffeine-free and carbonation-free fluids each day. Patient was encouraged to eliminate sugar-sweetened beverages such as sweet tea, fruit juice, fruit smoothies, flavored coffee drinks and regular sodas. During the weight loss trial, patient is encouraged to focus on eating protein-forward meals, with a daily goal of  grams protein. Patient was also advised to decrease carbohydrate intake to <100 g/d, choosing complex vs. simple carbohydrates in limited amounts. We also discussed limiting fat intake. Encouraged patient to follow the \"3-gram rule\" when choosing foods by looking for items containing <3 g of fat and sugar per serving. We reviewed meal planning guidelines and discussed appropriate meal and snack options. We also talked about appropriate protein drinks and patient was encouraged to start trying these, using them either for a meal replacement or a protein-rich snack pre-operatively. We reviewed the nutritional guidelines for selecting protein shakes. Pre-operative vitamin and mineral supplementation was reviewed. Patient was instructed to choose a chewable complete multivitamin with iron in NON-gummy form. Selection of probiotic was also reviewed. We also talked about dining out after bariatric surgery. Patient was instructed on:   Following the Key Diet Principles to stay within the bariatric dietary guidelines  Key words to look for in menu item descriptions in order to make healthier choices  Requesting specific substitutions to allow meals to fit in with bariatric dietary guidelines  Using the restaurant card to request smaller portions of menu items or ordering from children's/senior's menu    Patient's current diet habits include:  Patient is eating 3 meals and 3 snacks per day. Per dietary recall, pts diet has the following concerns:  Pt has found 2 protein supplement shakes for use post-op in meeting their protein needs. None noted. Patient's plan for dietary and/or behavior changes in the upcoming month: none noted. Physical Activity/Exercise    Comments: We talked about exercise. Patient was given reasons of why exercise is so important and how that can help with their long-term success. I have encouraged patient to get a support system to help with the activity. We talked about recommended types of physical activity, including walking, swimming, cycling, or chair exercises. I also talked with patient about doing some strength training, which helps the metabolism, as well as strengthen muscles and bones. Patient's plan to incorporate more activity includes: NONE NOTED. Behavior Modification     Comments:  During today's class, we discussed important behavior changes relating to dining away from home that will help the patient be successful in meeting their weight loss goals and improving their health including:   The need to limit the frequency of dining away from home, especially fast food and convenience food options, in order to limit intake of calories, fats, and carbohydrates  The importance of keeping a positive mindset by focusing on the occasion and enjoying the company rather than the foods they can't have and the need for reduced portion sizes after surgery  Avoiding starters such as appetizers, breads, bar snacks, soups, and salads  Researching to find restaurants that offer healthier food options and reviewing menus/nutrition facts online to have a plan of what to order before dining out    Class participants watched a video of a former patient's success story, where personal accountability and encouragement to make the appropriate lifestyle changes for improved of quality of life were discussed. Goals: Work to increase to 3-4 small meals per day, with 1-2 planned snacks as needed. Recommend following the plate method for meal planning - focusing on lean protein, non-starchy vegetables, and measured amounts of complex carbohydrates. - Goal of  g protein and <100 g carbohydrates per day. - Select at least 2 DIFFERENT protein supplements that can be used for protein supplementation to meet goals pre- and post-operatively. - Practice Carbohydrate Counting and label reading.   - Follow 3 g rule for fat and sugar. 2. Slow down meals  - Chew each bite 25-35 times. - Aim for 20-30 min/meal.  3. Increase non-caloric fluids to 64 oz per day. Eliminate caffeine, added sugar, carbonation, and straws.               - Continue to work to decrease sugar sweetened beverages - goal of calorie-free beverages only. - Must eliminate caffeine prior to surgery and avoid for ~6-8 weeks. - Practice 30:30 rule,  food and fluid intake. 4. Start activity regimen, work to increase ADLs. 5. Start Complete MVI and probiotic at least 30 days pre-op. CANDIDATE FOR SURGERY (per RD): YES - Pt acknowledges understanding that bariatric surgery requires lifelong adherence to dietary and exercise behavior change recommendations in order to be successful with weight loss and weight-loss maintenance. Pt demonstrates understanding of post-op key diet principles and recommendations. Pt passed bariatric quiz with at least 80% pass rate. Pt does not have any questions/concerns at this time. Pt is engaging in 3 days/week of physical activity. Pt does not have any questions/concerns at this time. RD contact information provided to pt for any future nutrition-related questions or concerns.     Patient is aware that they will be attending pre-op class 2 weeks before surgery and will get more detailed information on the post-op diet guidelines. Patient will have post-op telephone nutrition appointment at 2 weeks post-op. At 2 weeks post-op appt, RD will assess patient's liquid diet tolerance and readiness to advance to soft/pureed diet. Pt will also have a follow-up telephone nutrition appointment at 1 mo. post op. At 1-month post-op visit, RD will assess how patient is tolerating soft/puree protein and advance to add vegetables, if tolerating soft protein without difficulty. At this appointment, RD will also add vitamins, per protocol, to pts current vitamin and mineral regimen. RD will assess patient's compliance with current protocol, including diet, vitamins, protein shakes, and exercise. Post-op diet guidelines will be reinforced. RD is available for questions and to meet with patient outside of the 2 week and 1-month post-op visits.

## 2023-03-10 ENCOUNTER — OFFICE VISIT (OUTPATIENT)
Age: 33
End: 2023-03-10
Payer: MEDICAID

## 2023-03-10 VITALS
WEIGHT: 256 LBS | TEMPERATURE: 97.1 F | OXYGEN SATURATION: 100 % | DIASTOLIC BLOOD PRESSURE: 91 MMHG | HEIGHT: 64 IN | BODY MASS INDEX: 43.71 KG/M2 | SYSTOLIC BLOOD PRESSURE: 146 MMHG | HEART RATE: 81 BPM

## 2023-03-10 DIAGNOSIS — N20.0 CALCULUS OF KIDNEY: ICD-10-CM

## 2023-03-10 DIAGNOSIS — Z87.891 FORMER SMOKER: ICD-10-CM

## 2023-03-10 DIAGNOSIS — E66.01 MORBID OBESITY WITH BODY MASS INDEX (BMI) OF 40.0 TO 49.9 (HCC): ICD-10-CM

## 2023-03-10 DIAGNOSIS — E66.01 MORBID OBESITY (HCC): Primary | ICD-10-CM

## 2023-03-10 DIAGNOSIS — K30 FUNCTIONAL DYSPEPSIA: ICD-10-CM

## 2023-03-10 PROBLEM — F12.90 MARIJUANA USE: Status: RESOLVED | Noted: 2022-10-12 | Resolved: 2023-03-10

## 2023-03-10 PROCEDURE — 99214 OFFICE O/P EST MOD 30 MIN: CPT | Performed by: SPECIALIST

## 2023-03-10 RX ORDER — KETOCONAZOLE 20 MG/ML
SHAMPOO TOPICAL
COMMUNITY
Start: 2023-03-09

## 2023-03-10 RX ORDER — MULTIVIT WITH IRON,MINERALS
1 TABLET,CHEWABLE ORAL DAILY
COMMUNITY

## 2023-03-10 RX ORDER — CLOBETASOL PROPIONATE 0.46 MG/ML
SOLUTION TOPICAL
COMMUNITY
Start: 2023-03-09

## 2023-03-10 ASSESSMENT — PATIENT HEALTH QUESTIONNAIRE - PHQ9
SUM OF ALL RESPONSES TO PHQ QUESTIONS 1-9: 0
2. FEELING DOWN, DEPRESSED OR HOPELESS: 0
SUM OF ALL RESPONSES TO PHQ QUESTIONS 1-9: 0
SUM OF ALL RESPONSES TO PHQ QUESTIONS 1-9: 0
SUM OF ALL RESPONSES TO PHQ9 QUESTIONS 1 & 2: 0
1. LITTLE INTEREST OR PLEASURE IN DOING THINGS: 0
SUM OF ALL RESPONSES TO PHQ QUESTIONS 1-9: 0

## 2023-03-10 NOTE — PROGRESS NOTES
Bariatric Surgery Preoperative Progress Note    Subjective:     Mindy Canavan is a 33 y.o. obese female with a Body mass index is 43.94 kg/m². Sherryle Moder she desires surgery at this time because of health issues and quality of life issues. Mindy Canavan has been seen by a bariatric nutritionist and has been placed on an appropriate low carbohydrate diet. The patient desires sleeve gastrectomy for surgical weight loss, however she is here today to review their workup to date. Mindy Canavan is here also today to check progress with weight loss / evaluate nutritional status and review all subspecialty clearances in hopes of proceeding to the operating room. Patient Active Problem List    Diagnosis Date Noted    Morbid obesity with body mass index (BMI) of 40.0 to 49.9 ([facility] [address] 10/12/2022    Smoker 10/12/2022    Marijuana use 10/12/2022    Functional dyspepsia 10/12/2022    Morbid obesity with BMI of 45.0-49.9, adult ([facility] [address]     Calculus of kidney     Former smoker     Anemia     Depression 2015    Morbid obesity ([facility] [address] 2013      Past Surgical History:   Procedure Laterality Date    CHOLECYSTECTOMY      CYSTOSCOPY      stone extraction      Social History     Tobacco Use    Smoking status: Former     Types: Cigarettes     Quit date: 2022     Years since quittin.5    Smokeless tobacco: Never   Substance Use Topics    Alcohol use: No     Alcohol/week: 1.7 standard drinks      Family History   Problem Relation Age of Onset    Diabetes Father     Obesity Father       Current Outpatient Medications   Medication Sig Dispense Refill    clobetasol (TEMOVATE) 0.05 % external solution       tretinoin (RETIN-A) 0.025 % cream       ketoconazole (NIZORAL) 2 % shampoo       Pediatric Multivitamins-Iron (FLINTSTONES COMPLETE) 10 MG CHEW tablet Take 1 tablet by mouth daily       No current facility-administered medications for this visit.      No Known Allergies       Review of Systems:            General - No history or complaints of unexpected fever, chills, or weight loss  Head/Neck - No history or complaints of headache, diplopia, dysphagia, hearing loss  Cardiac - No history or complaints of chest pain, palpitations, murmur, or shortness of breath  Pulmonary - No history or complaints of shortness of breath, productive cough, hemoptysis  Gastrointestinal - No history or complaints of reflux,  abdominal pain, obstipation/constipation, blood per rectum  Genitourinary - No history or complaints of hematuria/dysuria, stress urinary incontinence symptoms, or renal lithiasis  Musculoskeletal - No history or complaints of joint pain or muscular weakness  Hematologic - No history or complaints of bleeding disorders, blood transfusions, sickle cell anemia  Neurologic - No history or complaints of  migraine headaches, seizure activity, syncopal episodes, TIA or stroke  Integumentary - No history or complaints of rashes, abnormal nevi, skin cancer  Gynecological - normal menses           Objective:   BP (!) 146/91   Pulse 81   Temp 97.1 °F (36.2 °C)   Ht 5' 4\" (1.626 m)   Wt 256 lb (116.1 kg)   LMP 02/20/2023   SpO2 100%   BMI 43.94 kg/m²     Physical Examination: General appearance - alert, well appearing, and in no distress and oriented to person, place, and time  Mental status - alert, oriented to person, place, and time, normal mood, behavior, speech, dress, motor activity, and thought processes  Eyes - pupils equal and reactive, extraocular eye movements intact, sclera anicteric, left eye normal, right eye normal  Ears - right ear normal, left ear normal  Nose - normal and patent, no erythema, discharge or polyps  Mouth - mucous membranes moist, pharynx normal without lesions  Neck - supple, no significant adenopathy  Chest - clear to auscultation, no wheezes, rales or rhonchi, symmetric air entry  Heart - normal rate, regular rhythm, normal S1, S2, no murmurs, rubs, clicks or gallops  Abdomen - soft, nontender, nondistended, no masses or organomegaly  Back exam - full range of motion, no tenderness, palpable spasm or pain on motion  Neurological - alert, oriented, normal speech, no focal findings or movement disorder noted  Musculoskeletal - no joint tenderness, deformity or swelling  Extremities - peripheral pulses normal, no pedal edema, no clubbing or cyanosis    Labs:     No results found for this or any previous visit (from the past 2016 hour(s)). Assessment:     Morbid obesity with associated comorbidity     Plan:     Continuation of Pre-Operative evaluation / clearance. Westley Rodriguez has returned to the office today to discuss her status as a surgical candidate.    her progress has been noted and reviewed. We will continue the pre-operative process and work towards goals as outlined. she has 0 more pounds to lose before proceeding to the OR.  (0 pounds lost since last visit)  she has 0 more nutritional visits to complete before proceeding to the OR  she has no clearance to review before proceeding to the OR. Westley Rodriguez understand the rationales for all the above. It has been discussed that given her   condition   that the best surgical option for this patient would be the Sleeve gastrectomy. Westley Rodriguez   agrees with the surgical choice and has been educated in it's; risks, benefits, and alternatives. We will continue   with the pre-operative evaluation as needed to check progress.     Secondary Diagnoses:         Signed By: Sheng Mansfield MD     March 10, 2023

## 2023-03-10 NOTE — PATIENT INSTRUCTIONS

## 2023-03-20 DIAGNOSIS — E66.01 MORBID OBESITY (HCC): Primary | ICD-10-CM

## 2023-03-20 DIAGNOSIS — Z01.812 BLOOD TESTS PRIOR TO TREATMENT OR PROCEDURE: ICD-10-CM

## 2023-03-20 DIAGNOSIS — E66.01 MORBID OBESITY WITH BODY MASS INDEX (BMI) OF 40.0 TO 49.9 (HCC): ICD-10-CM

## 2023-03-24 ENCOUNTER — HOSPITAL ENCOUNTER (OUTPATIENT)
Facility: HOSPITAL | Age: 33
Discharge: HOME | End: 2023-03-27

## 2023-03-24 ENCOUNTER — HOSPITAL ENCOUNTER (OUTPATIENT)
Facility: HOSPITAL | Age: 33
End: 2023-03-24
Payer: MEDICAID

## 2023-03-24 LAB
ABO + RH BLD: NORMAL
BLOOD GROUP ANTIBODIES SERPL: NORMAL
LABCORP SPECIMEN COLLECTION: NORMAL
SPECIMEN EXP DATE BLD: NORMAL

## 2023-03-24 PROCEDURE — 86900 BLOOD TYPING SEROLOGIC ABO: CPT

## 2023-03-24 PROCEDURE — 99001 SPECIMEN HANDLING PT-LAB: CPT

## 2023-03-25 LAB
ALBUMIN SERPL-MCNC: 4.2 G/DL (ref 3.8–4.8)
ALBUMIN SERPL-MCNC: 4.2 G/DL (ref 3.8–4.8)
ALBUMIN/GLOB SERPL: 1.2 {RATIO} (ref 1.2–2.2)
ALBUMIN/GLOB SERPL: 1.2 {RATIO} (ref 1.2–2.2)
ALP SERPL-CCNC: 95 IU/L (ref 44–121)
ALP SERPL-CCNC: 95 IU/L (ref 44–121)
ALT SERPL-CCNC: 8 IU/L (ref 0–32)
ALT SERPL-CCNC: 8 IU/L (ref 0–32)
AST SERPL-CCNC: 13 IU/L (ref 0–40)
AST SERPL-CCNC: 13 IU/L (ref 0–40)
BASOPHILS # BLD AUTO: 0 X10E3/UL (ref 0–0.2)
BASOPHILS # BLD AUTO: 0 X10E3/UL (ref 0–0.2)
BASOPHILS NFR BLD AUTO: 0 %
BASOPHILS NFR BLD AUTO: 0 %
BILIRUB SERPL-MCNC: 0.5 MG/DL (ref 0–1.2)
BILIRUB SERPL-MCNC: 0.5 MG/DL (ref 0–1.2)
BUN SERPL-MCNC: 11 MG/DL (ref 6–20)
BUN SERPL-MCNC: 11 MG/DL (ref 6–20)
BUN/CREAT SERPL: 15 (ref 9–23)
BUN/CREAT SERPL: 15 (ref 9–23)
CALCIUM SERPL-MCNC: 9.2 MG/DL (ref 8.7–10.2)
CALCIUM SERPL-MCNC: 9.2 MG/DL (ref 8.7–10.2)
CHLORIDE SERPL-SCNC: 102 MMOL/L (ref 96–106)
CHLORIDE SERPL-SCNC: 102 MMOL/L (ref 96–106)
CO2 SERPL-SCNC: 23 MMOL/L (ref 20–29)
CO2 SERPL-SCNC: 23 MMOL/L (ref 20–29)
CREAT SERPL-MCNC: 0.75 MG/DL (ref 0.57–1)
CREAT SERPL-MCNC: 0.75 MG/DL (ref 0.57–1)
EGFRCR SERPLBLD CKD-EPI 2021: 109 ML/MIN/1.73
EGFRCR SERPLBLD CKD-EPI 2021: 109 ML/MIN/1.73
EOSINOPHIL # BLD AUTO: 0.2 X10E3/UL (ref 0–0.4)
EOSINOPHIL # BLD AUTO: 0.2 X10E3/UL (ref 0–0.4)
EOSINOPHIL NFR BLD AUTO: 2 %
EOSINOPHIL NFR BLD AUTO: 2 %
ERYTHROCYTE [DISTWIDTH] IN BLOOD BY AUTOMATED COUNT: 14.5 % (ref 11.7–15.4)
ERYTHROCYTE [DISTWIDTH] IN BLOOD BY AUTOMATED COUNT: 14.5 % (ref 11.7–15.4)
GLOBULIN SER CALC-MCNC: 3.4 G/DL (ref 1.5–4.5)
GLOBULIN SER CALC-MCNC: 3.4 G/DL (ref 1.5–4.5)
GLUCOSE SERPL-MCNC: 89 MG/DL (ref 70–99)
GLUCOSE SERPL-MCNC: 89 MG/DL (ref 70–99)
HCG INTACT+B SERPL-ACNC: <1 MIU/ML
HCG INTACT+B SERPL-ACNC: <1 MIU/ML
HCT VFR BLD AUTO: 32.5 % (ref 34–46.6)
HCT VFR BLD AUTO: 32.5 % (ref 34–46.6)
HGB BLD-MCNC: 10.6 G/DL (ref 11.1–15.9)
HGB BLD-MCNC: 10.6 G/DL (ref 11.1–15.9)
IMM GRANULOCYTES # BLD AUTO: 0 X10E3/UL (ref 0–0.1)
IMM GRANULOCYTES # BLD AUTO: 0 X10E3/UL (ref 0–0.1)
IMM GRANULOCYTES NFR BLD AUTO: 0 %
IMM GRANULOCYTES NFR BLD AUTO: 0 %
LYMPHOCYTES # BLD AUTO: 3.1 X10E3/UL (ref 0.7–3.1)
LYMPHOCYTES # BLD AUTO: 3.1 X10E3/UL (ref 0.7–3.1)
LYMPHOCYTES NFR BLD AUTO: 31 %
LYMPHOCYTES NFR BLD AUTO: 31 %
MCH RBC QN AUTO: 26.2 PG (ref 26.6–33)
MCH RBC QN AUTO: 26.2 PG (ref 26.6–33)
MCHC RBC AUTO-ENTMCNC: 32.6 G/DL (ref 31.5–35.7)
MCHC RBC AUTO-ENTMCNC: 32.6 G/DL (ref 31.5–35.7)
MCV RBC AUTO: 80 FL (ref 79–97)
MCV RBC AUTO: 80 FL (ref 79–97)
MONOCYTES # BLD AUTO: 0.8 X10E3/UL (ref 0.1–0.9)
MONOCYTES # BLD AUTO: 0.8 X10E3/UL (ref 0.1–0.9)
MONOCYTES NFR BLD AUTO: 8 %
MONOCYTES NFR BLD AUTO: 8 %
NEUTROPHILS # BLD AUTO: 5.8 X10E3/UL (ref 1.4–7)
NEUTROPHILS # BLD AUTO: 5.8 X10E3/UL (ref 1.4–7)
NEUTROPHILS NFR BLD AUTO: 59 %
NEUTROPHILS NFR BLD AUTO: 59 %
PLATELET # BLD AUTO: 483 X10E3/UL (ref 150–450)
PLATELET # BLD AUTO: 483 X10E3/UL (ref 150–450)
POTASSIUM SERPL-SCNC: 3.8 MMOL/L (ref 3.5–5.2)
POTASSIUM SERPL-SCNC: 3.8 MMOL/L (ref 3.5–5.2)
PROT SERPL-MCNC: 7.6 G/DL (ref 6–8.5)
PROT SERPL-MCNC: 7.6 G/DL (ref 6–8.5)
RBC # BLD AUTO: 4.04 X10E6/UL (ref 3.77–5.28)
RBC # BLD AUTO: 4.04 X10E6/UL (ref 3.77–5.28)
SODIUM SERPL-SCNC: 139 MMOL/L (ref 134–144)
SODIUM SERPL-SCNC: 139 MMOL/L (ref 134–144)
SPECIMEN STATUS REPORT, ROLRST: NORMAL
SPECIMEN STATUS REPORT: NORMAL
WBC # BLD AUTO: 10 X10E3/UL (ref 3.4–10.8)
WBC # BLD AUTO: 10 X10E3/UL (ref 3.4–10.8)

## 2023-03-27 ENCOUNTER — OFFICE VISIT (OUTPATIENT)
Age: 33
End: 2023-03-27
Payer: MEDICAID

## 2023-03-27 ENCOUNTER — CLINICAL DOCUMENTATION (OUTPATIENT)
Age: 33
End: 2023-03-27

## 2023-03-27 ENCOUNTER — HOSPITAL ENCOUNTER (OUTPATIENT)
Facility: HOSPITAL | Age: 33
Discharge: HOME | End: 2023-03-30

## 2023-03-27 ENCOUNTER — HOSPITAL ENCOUNTER (OUTPATIENT)
Facility: HOSPITAL | Age: 33
Discharge: HOME | End: 2023-03-29
Payer: MEDICAID

## 2023-03-27 VITALS
DIASTOLIC BLOOD PRESSURE: 89 MMHG | WEIGHT: 261.4 LBS | OXYGEN SATURATION: 100 % | HEART RATE: 90 BPM | HEIGHT: 64 IN | BODY MASS INDEX: 44.63 KG/M2 | TEMPERATURE: 97.8 F | SYSTOLIC BLOOD PRESSURE: 137 MMHG

## 2023-03-27 DIAGNOSIS — Z87.891 FORMER SMOKER: ICD-10-CM

## 2023-03-27 DIAGNOSIS — N20.0 CALCULUS OF KIDNEY: ICD-10-CM

## 2023-03-27 DIAGNOSIS — G89.18 POST-OP PAIN: ICD-10-CM

## 2023-03-27 DIAGNOSIS — E66.01 MORBID OBESITY (HCC): ICD-10-CM

## 2023-03-27 DIAGNOSIS — F17.200 SMOKER: ICD-10-CM

## 2023-03-27 DIAGNOSIS — K30 FUNCTIONAL DYSPEPSIA: Primary | ICD-10-CM

## 2023-03-27 DIAGNOSIS — E66.01 MORBID OBESITY WITH BMI OF 45.0-49.9, ADULT (HCC): ICD-10-CM

## 2023-03-27 LAB
EKG ATRIAL RATE: 82 BPM
EKG DIAGNOSIS: NORMAL
EKG P AXIS: 68 DEGREES
EKG P-R INTERVAL: 160 MS
EKG Q-T INTERVAL: 362 MS
EKG QRS DURATION: 86 MS
EKG QTC CALCULATION (BAZETT): 422 MS
EKG R AXIS: 76 DEGREES
EKG T AXIS: 18 DEGREES
EKG VENTRICULAR RATE: 82 BPM

## 2023-03-27 PROCEDURE — 99215 OFFICE O/P EST HI 40 MIN: CPT | Performed by: SPECIALIST

## 2023-03-27 PROCEDURE — 93005 ELECTROCARDIOGRAM TRACING: CPT | Performed by: SPECIALIST

## 2023-03-27 RX ORDER — OXYCODONE HYDROCHLORIDE AND ACETAMINOPHEN 5; 325 MG/1; MG/1
1 TABLET ORAL EVERY 6 HOURS PRN
Qty: 28 TABLET | Refills: 0 | Status: SHIPPED | OUTPATIENT
Start: 2023-03-27 | End: 2023-04-03

## 2023-03-27 RX ORDER — ONDANSETRON 4 MG/1
4 TABLET, ORALLY DISINTEGRATING ORAL EVERY 8 HOURS PRN
Qty: 30 TABLET | Refills: 0 | Status: SHIPPED | OUTPATIENT
Start: 2023-03-27

## 2023-03-27 RX ORDER — ENOXAPARIN SODIUM 100 MG/ML
INJECTION SUBCUTANEOUS
Qty: 20 EACH | Refills: 0 | Status: SHIPPED | OUTPATIENT
Start: 2023-03-27

## 2023-03-27 ASSESSMENT — PATIENT HEALTH QUESTIONNAIRE - PHQ9
SUM OF ALL RESPONSES TO PHQ QUESTIONS 1-9: 0
SUM OF ALL RESPONSES TO PHQ QUESTIONS 1-9: 0
SUM OF ALL RESPONSES TO PHQ9 QUESTIONS 1 & 2: 0
SUM OF ALL RESPONSES TO PHQ QUESTIONS 1-9: 0
SUM OF ALL RESPONSES TO PHQ QUESTIONS 1-9: 0
1. LITTLE INTEREST OR PLEASURE IN DOING THINGS: 0
2. FEELING DOWN, DEPRESSED OR HOPELESS: 0

## 2023-03-27 NOTE — H&P (VIEW-ONLY)
Sleeve Gastrectomy - History and Physical    Subjective: The patient is a 33 y.o. obese female with a Body mass index is 44.87 kg/m². .   she presents now to review their work up to date to see if they are a candidate for surgery and whether or not to proceed with the previously requested procedure. Bariatric comorbidities continue to include:   Patient Active Problem List   Diagnosis    Morbid obesity with BMI of 45.0-49.9, adult ([address]    Depression    Morbid obesity ([address]    Calculus of kidney    Former smoker    Anemia    Smoker    Functional dyspepsia       They have been generally well prior to this visit and have had no recent significant illnesses. The patient has had no gastrointestinal issues that would preclude them from proceeding with the surgery they have chosen. Garfield Gutierrez has recently tried a preoperative weight loss program  in addition to seeing a bariatric nutritionist preoperatively. We have discussed on at least one other occasion about the various types of surgical weight loss procedures and they have considered these options after our initial consultation. We have once again discussed these procedures in detail and they have now decided on a surgical procedure. They present today to discuss this and confirm that their evaluation pre operatively is acceptable to continue with surgery. The patient desires laparoscopic sleeve gastrectomy for surgical weight loss. The patients goal weight is 170lb. (this represents a BMI of 28)    These goals are consistent with expected outcomes of their desired operation. her Medical goals are resolution of these health issues.     Patient Active Problem List    Diagnosis Date Noted    Smoker 10/12/2022    Functional dyspepsia 10/12/2022    Morbid obesity with BMI of 45.0-49.9, adult ([address]     Calculus of kidney     Former smoker     Anemia     Depression 12/16/2015    Morbid obesity ([address] 04/16/2013     Past Surgical History:   Procedure

## 2023-03-27 NOTE — PATIENT INSTRUCTIONS
Patient Instructions      1. Continue to monitor carbohydrate and protein intake- remember to keep your           total  carbohydrates to 50 grams or less per day for best results. 2. Remember hydration goals - usually 48 to 64 ounces of liquids per day  3. Continue to work towards exercise goals - minimum 3 days per week of 45          minutes to  1 hour at a time. 4. Remember to take vitamins as directed        Supplement Resource Guide    Importance of Protein:   Maintains lean body mass, produces antibodies to fight off infections, heals wounds, minimizes hair loss, helps to give you energy, helps with satiety, and keeping you full between meals. Importance of Calcium:  Needed for healthy bones and teeth, normal blood clotting, and nervous system functioning, higher risk of osteoporosis and bone disease with non-compliance. Importance of Multivitamins: Many functions. Supply you with extra nutrients that you may be missing from food. May lead to iron deficiency anemia, weakness, fatigue, and many other symptoms with non-compliance. Importance of B Vitamins:  Important for red blood cell formation, metabolism, energy, and helps to maintain a healthy nervous system. Protein Supplement  Find one you like now. Use immediately after surgery. Look for:  35-50g protein each day from your protein supplement once you reach the progression diet. 0-3 g fat per serving  0-3 g sugar per serving    Protein drinks should be split in separate dosages. Recommend: Lifelong  1 year + Calcium Supplement:     Start taking within a month after surgery. Look for: Calcium Citrate Plus D (1500 mg per day)  Recommend: Citracal     .          Avoid chocolate chewable calcium. Can use chewable bariatric or [employer] brand or similar chewable. The body cannot absorb more than 500-600 mg @ a time.       Take for Life Multi-vitamin Supplement:      1st Month

## 2023-04-05 ENCOUNTER — ANESTHESIA EVENT (OUTPATIENT)
Facility: HOSPITAL | Age: 33
End: 2023-04-05
Payer: MEDICAID

## 2023-04-06 ENCOUNTER — HOSPITAL ENCOUNTER (INPATIENT)
Facility: HOSPITAL | Age: 33
LOS: 1 days | Discharge: HOME | End: 2023-04-07
Attending: SPECIALIST | Admitting: SPECIALIST
Payer: MEDICAID

## 2023-04-06 ENCOUNTER — ANESTHESIA (OUTPATIENT)
Facility: HOSPITAL | Age: 33
End: 2023-04-06
Payer: MEDICAID

## 2023-04-06 PROBLEM — E66.01 MORBID OBESITY WITH BODY MASS INDEX (BMI) OF 40.0 TO 49.9 (HCC): Status: ACTIVE | Noted: 2023-04-06

## 2023-04-06 PROBLEM — E66.01 MORBID OBESITY WITH BMI OF 40.0-44.9, ADULT (HCC): Status: ACTIVE | Noted: 2023-04-06

## 2023-04-06 PROBLEM — R03.0 ELEVATED BP WITHOUT DIAGNOSIS OF HYPERTENSION: Status: ACTIVE | Noted: 2023-04-06

## 2023-04-06 LAB
ABO + RH BLD: NORMAL
BLOOD GROUP ANTIBODIES SERPL: NORMAL
HCG UR QL: NEGATIVE
SPECIMEN EXP DATE BLD: NORMAL

## 2023-04-06 PROCEDURE — 2500000003 HC RX 250 WO HCPCS

## 2023-04-06 PROCEDURE — A4216 STERILE WATER/SALINE, 10 ML: HCPCS | Performed by: SPECIALIST

## 2023-04-06 PROCEDURE — 6370000000 HC RX 637 (ALT 250 FOR IP): Performed by: SPECIALIST

## 2023-04-06 PROCEDURE — 3600000015 HC SURGERY LEVEL 5 ADDTL 15MIN: Performed by: SPECIALIST

## 2023-04-06 PROCEDURE — 88307 TISSUE EXAM BY PATHOLOGIST: CPT

## 2023-04-06 PROCEDURE — 6360000002 HC RX W HCPCS: Performed by: SPECIALIST

## 2023-04-06 PROCEDURE — C1781 MESH (IMPLANTABLE): HCPCS | Performed by: SPECIALIST

## 2023-04-06 PROCEDURE — 2700000000 HC OXYGEN THERAPY PER DAY

## 2023-04-06 PROCEDURE — 3600000005 HC SURGERY LEVEL 5 BASE: Performed by: SPECIALIST

## 2023-04-06 PROCEDURE — 88313 SPECIAL STAINS GROUP 2: CPT

## 2023-04-06 PROCEDURE — 6360000002 HC RX W HCPCS

## 2023-04-06 PROCEDURE — 2709999900 HC NON-CHARGEABLE SUPPLY: Performed by: SPECIALIST

## 2023-04-06 PROCEDURE — 7100000001 HC PACU RECOVERY - ADDTL 15 MIN: Performed by: SPECIALIST

## 2023-04-06 PROCEDURE — 36415 COLL VENOUS BLD VENIPUNCTURE: CPT

## 2023-04-06 PROCEDURE — 7100000000 HC PACU RECOVERY - FIRST 15 MIN: Performed by: SPECIALIST

## 2023-04-06 PROCEDURE — 2500000003 HC RX 250 WO HCPCS: Performed by: SPECIALIST

## 2023-04-06 PROCEDURE — 2580000003 HC RX 258: Performed by: SPECIALIST

## 2023-04-06 PROCEDURE — 3700000001 HC ADD 15 MINUTES (ANESTHESIA): Performed by: SPECIALIST

## 2023-04-06 PROCEDURE — 3700000000 HC ANESTHESIA ATTENDED CARE: Performed by: SPECIALIST

## 2023-04-06 PROCEDURE — 1100000000 HC RM PRIVATE

## 2023-04-06 PROCEDURE — 81025 URINE PREGNANCY TEST: CPT

## 2023-04-06 PROCEDURE — C9113 INJ PANTOPRAZOLE SODIUM, VIA: HCPCS | Performed by: SPECIALIST

## 2023-04-06 PROCEDURE — A4217 STERILE WATER/SALINE, 500 ML: HCPCS | Performed by: SPECIALIST

## 2023-04-06 PROCEDURE — 2720000010 HC SURG SUPPLY STERILE: Performed by: SPECIALIST

## 2023-04-06 PROCEDURE — C1713 ANCHOR/SCREW BN/BN,TIS/BN: HCPCS | Performed by: SPECIALIST

## 2023-04-06 PROCEDURE — 88305 TISSUE EXAM BY PATHOLOGIST: CPT

## 2023-04-06 PROCEDURE — 86900 BLOOD TYPING SEROLOGIC ABO: CPT

## 2023-04-06 RX ORDER — ONDANSETRON 2 MG/ML
4 INJECTION INTRAMUSCULAR; INTRAVENOUS EVERY 6 HOURS PRN
Status: DISCONTINUED | OUTPATIENT
Start: 2023-04-06 | End: 2023-04-07 | Stop reason: HOSPADM

## 2023-04-06 RX ORDER — LABETALOL HYDROCHLORIDE 5 MG/ML
10 INJECTION, SOLUTION INTRAVENOUS ONCE
Status: COMPLETED | OUTPATIENT
Start: 2023-04-06 | End: 2023-04-06

## 2023-04-06 RX ORDER — ONDANSETRON 2 MG/ML
INJECTION INTRAMUSCULAR; INTRAVENOUS PRN
Status: DISCONTINUED | OUTPATIENT
Start: 2023-04-06 | End: 2023-04-06 | Stop reason: SDUPTHER

## 2023-04-06 RX ORDER — PROPOFOL 10 MG/ML
INJECTION, EMULSION INTRAVENOUS PRN
Status: DISCONTINUED | OUTPATIENT
Start: 2023-04-06 | End: 2023-04-06 | Stop reason: SDUPTHER

## 2023-04-06 RX ORDER — HYDRALAZINE HYDROCHLORIDE 20 MG/ML
10 INJECTION INTRAMUSCULAR; INTRAVENOUS ONCE
Status: COMPLETED | OUTPATIENT
Start: 2023-04-06 | End: 2023-04-06

## 2023-04-06 RX ORDER — SODIUM CHLORIDE 9 MG/ML
INJECTION, SOLUTION INTRAVENOUS PRN
Status: DISCONTINUED | OUTPATIENT
Start: 2023-04-06 | End: 2023-04-06 | Stop reason: HOSPADM

## 2023-04-06 RX ORDER — DROPERIDOL 2.5 MG/ML
0.62 INJECTION, SOLUTION INTRAMUSCULAR; INTRAVENOUS
Status: COMPLETED | OUTPATIENT
Start: 2023-04-06 | End: 2023-04-06

## 2023-04-06 RX ORDER — KETOROLAC TROMETHAMINE 30 MG/ML
30 INJECTION, SOLUTION INTRAMUSCULAR; INTRAVENOUS EVERY 6 HOURS
Status: DISCONTINUED | OUTPATIENT
Start: 2023-04-06 | End: 2023-04-06

## 2023-04-06 RX ORDER — SUCCINYLCHOLINE/SOD CL,ISO/PF 100 MG/5ML
SYRINGE (ML) INTRAVENOUS PRN
Status: DISCONTINUED | OUTPATIENT
Start: 2023-04-06 | End: 2023-04-06 | Stop reason: SDUPTHER

## 2023-04-06 RX ORDER — SODIUM CHLORIDE 0.9 % (FLUSH) 0.9 %
5-40 SYRINGE (ML) INJECTION EVERY 12 HOURS SCHEDULED
Status: CANCELLED | OUTPATIENT
Start: 2023-04-06

## 2023-04-06 RX ORDER — HYDRALAZINE HYDROCHLORIDE 20 MG/ML
20 INJECTION INTRAMUSCULAR; INTRAVENOUS EVERY 6 HOURS PRN
Status: DISCONTINUED | OUTPATIENT
Start: 2023-04-06 | End: 2023-04-07

## 2023-04-06 RX ORDER — SODIUM CHLORIDE, SODIUM LACTATE, POTASSIUM CHLORIDE, CALCIUM CHLORIDE 600; 310; 30; 20 MG/100ML; MG/100ML; MG/100ML; MG/100ML
INJECTION, SOLUTION INTRAVENOUS CONTINUOUS
Status: DISCONTINUED | OUTPATIENT
Start: 2023-04-06 | End: 2023-04-07

## 2023-04-06 RX ORDER — ENOXAPARIN SODIUM 100 MG/ML
40 INJECTION SUBCUTANEOUS EVERY 12 HOURS SCHEDULED
Status: DISCONTINUED | OUTPATIENT
Start: 2023-04-06 | End: 2023-04-07 | Stop reason: HOSPADM

## 2023-04-06 RX ORDER — ONDANSETRON 2 MG/ML
4 INJECTION INTRAMUSCULAR; INTRAVENOUS
Status: COMPLETED | OUTPATIENT
Start: 2023-04-06 | End: 2023-04-06

## 2023-04-06 RX ORDER — SODIUM CHLORIDE 0.9 % (FLUSH) 0.9 %
5-40 SYRINGE (ML) INJECTION EVERY 12 HOURS SCHEDULED
Status: DISCONTINUED | OUTPATIENT
Start: 2023-04-06 | End: 2023-04-06 | Stop reason: HOSPADM

## 2023-04-06 RX ORDER — DIPHENHYDRAMINE HYDROCHLORIDE 50 MG/ML
12.5 INJECTION INTRAMUSCULAR; INTRAVENOUS
Status: DISCONTINUED | OUTPATIENT
Start: 2023-04-06 | End: 2023-04-06 | Stop reason: HOSPADM

## 2023-04-06 RX ORDER — ROCURONIUM BROMIDE 10 MG/ML
INJECTION, SOLUTION INTRAVENOUS PRN
Status: DISCONTINUED | OUTPATIENT
Start: 2023-04-06 | End: 2023-04-06 | Stop reason: SDUPTHER

## 2023-04-06 RX ORDER — DEXMEDETOMIDINE HYDROCHLORIDE 100 UG/ML
INJECTION, SOLUTION INTRAVENOUS PRN
Status: DISCONTINUED | OUTPATIENT
Start: 2023-04-06 | End: 2023-04-06 | Stop reason: SDUPTHER

## 2023-04-06 RX ORDER — KETOROLAC TROMETHAMINE 30 MG/ML
30 INJECTION, SOLUTION INTRAMUSCULAR; INTRAVENOUS EVERY 6 HOURS
Status: DISCONTINUED | OUTPATIENT
Start: 2023-04-06 | End: 2023-04-07 | Stop reason: HOSPADM

## 2023-04-06 RX ORDER — ACETAMINOPHEN 500 MG
1000 TABLET ORAL ONCE
Status: COMPLETED | OUTPATIENT
Start: 2023-04-06 | End: 2023-04-06

## 2023-04-06 RX ORDER — SODIUM CHLORIDE 0.9 % (FLUSH) 0.9 %
5-40 SYRINGE (ML) INJECTION PRN
Status: DISCONTINUED | OUTPATIENT
Start: 2023-04-06 | End: 2023-04-06 | Stop reason: HOSPADM

## 2023-04-06 RX ORDER — SODIUM CHLORIDE 0.9 % (FLUSH) 0.9 %
5-40 SYRINGE (ML) INJECTION PRN
Status: DISCONTINUED | OUTPATIENT
Start: 2023-04-06 | End: 2023-04-07 | Stop reason: HOSPADM

## 2023-04-06 RX ORDER — MIDAZOLAM HYDROCHLORIDE 1 MG/ML
INJECTION INTRAMUSCULAR; INTRAVENOUS PRN
Status: DISCONTINUED | OUTPATIENT
Start: 2023-04-06 | End: 2023-04-06 | Stop reason: SDUPTHER

## 2023-04-06 RX ORDER — OXYCODONE HYDROCHLORIDE 5 MG/1
5 TABLET ORAL
Status: DISCONTINUED | OUTPATIENT
Start: 2023-04-06 | End: 2023-04-06 | Stop reason: HOSPADM

## 2023-04-06 RX ORDER — CLONIDINE 0.3 MG/24H
1 PATCH, EXTENDED RELEASE TRANSDERMAL WEEKLY
Status: DISCONTINUED | OUTPATIENT
Start: 2023-04-06 | End: 2023-04-07

## 2023-04-06 RX ORDER — LIDOCAINE HYDROCHLORIDE 20 MG/ML
INJECTION, SOLUTION EPIDURAL; INFILTRATION; INTRACAUDAL; PERINEURAL PRN
Status: DISCONTINUED | OUTPATIENT
Start: 2023-04-06 | End: 2023-04-06 | Stop reason: SDUPTHER

## 2023-04-06 RX ORDER — SODIUM CHLORIDE, SODIUM LACTATE, POTASSIUM CHLORIDE, CALCIUM CHLORIDE 600; 310; 30; 20 MG/100ML; MG/100ML; MG/100ML; MG/100ML
INJECTION, SOLUTION INTRAVENOUS CONTINUOUS
Status: DISCONTINUED | OUTPATIENT
Start: 2023-04-06 | End: 2023-04-07 | Stop reason: HOSPADM

## 2023-04-06 RX ORDER — SODIUM CHLORIDE 0.9 % (FLUSH) 0.9 %
5-40 SYRINGE (ML) INJECTION EVERY 12 HOURS SCHEDULED
Status: DISCONTINUED | OUTPATIENT
Start: 2023-04-06 | End: 2023-04-07 | Stop reason: HOSPADM

## 2023-04-06 RX ORDER — ENOXAPARIN SODIUM 100 MG/ML
40 INJECTION SUBCUTANEOUS ONCE
Status: COMPLETED | OUTPATIENT
Start: 2023-04-06 | End: 2023-04-06

## 2023-04-06 RX ORDER — SODIUM CHLORIDE 0.9 % (FLUSH) 0.9 %
5-40 SYRINGE (ML) INJECTION PRN
Status: CANCELLED | OUTPATIENT
Start: 2023-04-06

## 2023-04-06 RX ORDER — SODIUM CHLORIDE 9 MG/ML
INJECTION, SOLUTION INTRAVENOUS PRN
Status: DISCONTINUED | OUTPATIENT
Start: 2023-04-06 | End: 2023-04-07 | Stop reason: HOSPADM

## 2023-04-06 RX ORDER — IPRATROPIUM BROMIDE AND ALBUTEROL SULFATE 2.5; .5 MG/3ML; MG/3ML
1 SOLUTION RESPIRATORY (INHALATION)
Status: DISCONTINUED | OUTPATIENT
Start: 2023-04-06 | End: 2023-04-06 | Stop reason: HOSPADM

## 2023-04-06 RX ORDER — HYDROMORPHONE HYDROCHLORIDE 1 MG/ML
0.25 INJECTION, SOLUTION INTRAMUSCULAR; INTRAVENOUS; SUBCUTANEOUS EVERY 5 MIN PRN
Status: COMPLETED | OUTPATIENT
Start: 2023-04-06 | End: 2023-04-06

## 2023-04-06 RX ORDER — MORPHINE SULFATE 4 MG/ML
6 INJECTION INTRAVENOUS EVERY 4 HOURS PRN
Status: DISCONTINUED | OUTPATIENT
Start: 2023-04-06 | End: 2023-04-07

## 2023-04-06 RX ORDER — LABETALOL HYDROCHLORIDE 5 MG/ML
10 INJECTION, SOLUTION INTRAVENOUS
Status: COMPLETED | OUTPATIENT
Start: 2023-04-06 | End: 2023-04-06

## 2023-04-06 RX ORDER — LABETALOL HYDROCHLORIDE 5 MG/ML
INJECTION, SOLUTION INTRAVENOUS PRN
Status: DISCONTINUED | OUTPATIENT
Start: 2023-04-06 | End: 2023-04-06 | Stop reason: SDUPTHER

## 2023-04-06 RX ORDER — KETOROLAC TROMETHAMINE 30 MG/ML
30 INJECTION, SOLUTION INTRAMUSCULAR; INTRAVENOUS ONCE
Status: COMPLETED | OUTPATIENT
Start: 2023-04-06 | End: 2023-04-06

## 2023-04-06 RX ORDER — DIPHENHYDRAMINE HYDROCHLORIDE 50 MG/ML
INJECTION INTRAMUSCULAR; INTRAVENOUS PRN
Status: DISCONTINUED | OUTPATIENT
Start: 2023-04-06 | End: 2023-04-06 | Stop reason: SDUPTHER

## 2023-04-06 RX ORDER — SODIUM CHLORIDE 9 MG/ML
INJECTION, SOLUTION INTRAVENOUS PRN
Status: CANCELLED | OUTPATIENT
Start: 2023-04-06

## 2023-04-06 RX ORDER — FENTANYL CITRATE 50 UG/ML
INJECTION, SOLUTION INTRAMUSCULAR; INTRAVENOUS PRN
Status: DISCONTINUED | OUTPATIENT
Start: 2023-04-06 | End: 2023-04-06 | Stop reason: SDUPTHER

## 2023-04-06 RX ORDER — FENTANYL CITRATE 50 UG/ML
25 INJECTION, SOLUTION INTRAMUSCULAR; INTRAVENOUS EVERY 5 MIN PRN
Status: COMPLETED | OUTPATIENT
Start: 2023-04-06 | End: 2023-04-06

## 2023-04-06 RX ADMIN — Medication 12.5 MG: at 23:05

## 2023-04-06 RX ADMIN — HYDROMORPHONE HYDROCHLORIDE 0.25 MG: 1 INJECTION, SOLUTION INTRAMUSCULAR; INTRAVENOUS; SUBCUTANEOUS at 15:10

## 2023-04-06 RX ADMIN — SODIUM CHLORIDE, POTASSIUM CHLORIDE, SODIUM LACTATE AND CALCIUM CHLORIDE: 600; 310; 30; 20 INJECTION, SOLUTION INTRAVENOUS at 11:14

## 2023-04-06 RX ADMIN — SODIUM CHLORIDE, POTASSIUM CHLORIDE, SODIUM LACTATE AND CALCIUM CHLORIDE: 600; 310; 30; 20 INJECTION, SOLUTION INTRAVENOUS at 15:41

## 2023-04-06 RX ADMIN — HYDRALAZINE HYDROCHLORIDE 10 MG: 20 INJECTION INTRAMUSCULAR; INTRAVENOUS at 14:26

## 2023-04-06 RX ADMIN — DROPERIDOL 0.62 MG: 2.5 INJECTION, SOLUTION INTRAMUSCULAR; INTRAVENOUS at 15:32

## 2023-04-06 RX ADMIN — Medication 2000 MG: at 11:42

## 2023-04-06 RX ADMIN — LABETALOL HYDROCHLORIDE 2.5 MG: 5 INJECTION INTRAVENOUS at 12:05

## 2023-04-06 RX ADMIN — ONDANSETRON 4 MG: 2 INJECTION INTRAMUSCULAR; INTRAVENOUS at 13:59

## 2023-04-06 RX ADMIN — LABETALOL HYDROCHLORIDE 5 MG: 5 INJECTION INTRAVENOUS at 12:08

## 2023-04-06 RX ADMIN — FENTANYL CITRATE 100 MCG: 50 INJECTION, SOLUTION INTRAMUSCULAR; INTRAVENOUS at 11:58

## 2023-04-06 RX ADMIN — KETOROLAC TROMETHAMINE 30 MG: 30 INJECTION, SOLUTION INTRAMUSCULAR at 15:36

## 2023-04-06 RX ADMIN — ENOXAPARIN SODIUM 40 MG: 100 INJECTION SUBCUTANEOUS at 10:30

## 2023-04-06 RX ADMIN — ONDANSETRON HYDROCHLORIDE 4 MG: 2 INJECTION INTRAMUSCULAR; INTRAVENOUS at 11:42

## 2023-04-06 RX ADMIN — HYDROMORPHONE HYDROCHLORIDE 0.25 MG: 1 INJECTION, SOLUTION INTRAMUSCULAR; INTRAVENOUS; SUBCUTANEOUS at 14:14

## 2023-04-06 RX ADMIN — PROPOFOL 200 MG: 10 INJECTION, EMULSION INTRAVENOUS at 11:35

## 2023-04-06 RX ADMIN — HYDROMORPHONE HYDROCHLORIDE 0.25 MG: 1 INJECTION, SOLUTION INTRAMUSCULAR; INTRAVENOUS; SUBCUTANEOUS at 15:26

## 2023-04-06 RX ADMIN — FENTANYL CITRATE 25 MCG: 50 INJECTION, SOLUTION INTRAMUSCULAR; INTRAVENOUS at 13:57

## 2023-04-06 RX ADMIN — ACETAMINOPHEN 1000 MG: 500 TABLET ORAL at 10:29

## 2023-04-06 RX ADMIN — PANTOPRAZOLE SODIUM 40 MG: 40 INJECTION, POWDER, FOR SOLUTION INTRAVENOUS at 18:46

## 2023-04-06 RX ADMIN — DEXMEDETOMIDINE HYDROCHLORIDE 10 MCG: 100 INJECTION, SOLUTION INTRAVENOUS at 12:29

## 2023-04-06 RX ADMIN — LABETALOL HYDROCHLORIDE 10 MG: 5 INJECTION INTRAVENOUS at 13:44

## 2023-04-06 RX ADMIN — FAMOTIDINE 20 MG: 10 INJECTION, SOLUTION INTRAVENOUS at 11:18

## 2023-04-06 RX ADMIN — DEXMEDETOMIDINE HYDROCHLORIDE 6 MCG: 100 INJECTION, SOLUTION INTRAVENOUS at 12:50

## 2023-04-06 RX ADMIN — Medication 160 MG: at 11:35

## 2023-04-06 RX ADMIN — SODIUM CHLORIDE, POTASSIUM CHLORIDE, SODIUM LACTATE AND CALCIUM CHLORIDE: 600; 310; 30; 20 INJECTION, SOLUTION INTRAVENOUS at 18:46

## 2023-04-06 RX ADMIN — DIPHENHYDRAMINE HYDROCHLORIDE 12.5 MG: 50 INJECTION INTRAMUSCULAR; INTRAVENOUS at 11:42

## 2023-04-06 RX ADMIN — LABETALOL HYDROCHLORIDE 10 MG: 5 INJECTION INTRAVENOUS at 13:29

## 2023-04-06 RX ADMIN — ONDANSETRON 4 MG: 2 INJECTION INTRAMUSCULAR; INTRAVENOUS at 18:51

## 2023-04-06 RX ADMIN — KETOROLAC TROMETHAMINE 30 MG: 30 INJECTION, SOLUTION INTRAMUSCULAR at 23:04

## 2023-04-06 RX ADMIN — NYSTATIN 500000 UNITS: 100000 SUSPENSION ORAL at 10:31

## 2023-04-06 RX ADMIN — HYDROMORPHONE HYDROCHLORIDE 0.25 MG: 1 INJECTION, SOLUTION INTRAMUSCULAR; INTRAVENOUS; SUBCUTANEOUS at 14:37

## 2023-04-06 RX ADMIN — ROCURONIUM BROMIDE 40 MG: 10 INJECTION, SOLUTION INTRAVENOUS at 11:44

## 2023-04-06 RX ADMIN — SUGAMMADEX 200 MG: 100 INJECTION, SOLUTION INTRAVENOUS at 12:55

## 2023-04-06 RX ADMIN — HYDRALAZINE HYDROCHLORIDE 10 MG: 20 INJECTION INTRAMUSCULAR; INTRAVENOUS at 13:53

## 2023-04-06 RX ADMIN — MIDAZOLAM 2 MG: 1 INJECTION INTRAMUSCULAR; INTRAVENOUS at 11:28

## 2023-04-06 RX ADMIN — LABETALOL HYDROCHLORIDE 5 MG: 5 INJECTION INTRAVENOUS at 13:18

## 2023-04-06 RX ADMIN — FENTANYL CITRATE 25 MCG: 50 INJECTION, SOLUTION INTRAMUSCULAR; INTRAVENOUS at 13:23

## 2023-04-06 RX ADMIN — LABETALOL HYDROCHLORIDE 5 MG: 5 INJECTION INTRAVENOUS at 13:01

## 2023-04-06 RX ADMIN — ENOXAPARIN SODIUM 40 MG: 100 INJECTION SUBCUTANEOUS at 23:03

## 2023-04-06 RX ADMIN — LABETALOL HYDROCHLORIDE 10 MG: 5 INJECTION INTRAVENOUS at 14:00

## 2023-04-06 RX ADMIN — SUGAMMADEX 200 MG: 100 INJECTION, SOLUTION INTRAVENOUS at 13:12

## 2023-04-06 RX ADMIN — ROCURONIUM BROMIDE 10 MG: 10 INJECTION, SOLUTION INTRAVENOUS at 11:34

## 2023-04-06 RX ADMIN — FENTANYL CITRATE 25 MCG: 50 INJECTION, SOLUTION INTRAMUSCULAR; INTRAVENOUS at 13:42

## 2023-04-06 RX ADMIN — LIDOCAINE HYDROCHLORIDE 100 MG: 20 INJECTION, SOLUTION EPIDURAL; INFILTRATION; INTRACAUDAL; PERINEURAL at 11:35

## 2023-04-06 RX ADMIN — HYDROMORPHONE HYDROCHLORIDE 1 MG: 1 INJECTION, SOLUTION INTRAMUSCULAR; INTRAVENOUS; SUBCUTANEOUS at 11:28

## 2023-04-06 RX ADMIN — FENTANYL CITRATE 25 MCG: 50 INJECTION, SOLUTION INTRAMUSCULAR; INTRAVENOUS at 13:28

## 2023-04-06 RX ADMIN — MORPHINE SULFATE 6 MG: 4 INJECTION INTRAVENOUS at 18:46

## 2023-04-06 ASSESSMENT — PAIN DESCRIPTION - LOCATION
LOCATION: ABDOMEN
LOCATION: ABDOMEN
LOCATION: ABDOMEN;BACK
LOCATION: ABDOMEN

## 2023-04-06 ASSESSMENT — PAIN - FUNCTIONAL ASSESSMENT
PAIN_FUNCTIONAL_ASSESSMENT: WONG-BAKER FACES
PAIN_FUNCTIONAL_ASSESSMENT: NONE - DENIES PAIN
PAIN_FUNCTIONAL_ASSESSMENT: NONE - DENIES PAIN
PAIN_FUNCTIONAL_ASSESSMENT: 0-10

## 2023-04-06 ASSESSMENT — PAIN SCALES - GENERAL
PAINLEVEL_OUTOF10: 0
PAINLEVEL_OUTOF10: 6
PAINLEVEL_OUTOF10: 5
PAINLEVEL_OUTOF10: 5
PAINLEVEL_OUTOF10: 7
PAINLEVEL_OUTOF10: 6
PAINLEVEL_OUTOF10: 5
PAINLEVEL_OUTOF10: 7
PAINLEVEL_OUTOF10: 8
PAINLEVEL_OUTOF10: 6
PAINLEVEL_OUTOF10: 7

## 2023-04-06 ASSESSMENT — PAIN DESCRIPTION - ORIENTATION
ORIENTATION: ANTERIOR

## 2023-04-06 ASSESSMENT — LIFESTYLE VARIABLES: SMOKING_STATUS: 0

## 2023-04-06 ASSESSMENT — PAIN DESCRIPTION - DESCRIPTORS
DESCRIPTORS: ACHING;CRAMPING
DESCRIPTORS: BURNING
DESCRIPTORS: BURNING;ACHING

## 2023-04-06 NOTE — ANESTHESIA POSTPROCEDURE EVALUATION
Post-Anesthesia Evaluation/Assessment    Cardiovascular Function/Vital Signs  Visit Vitals  BP (!) 164/96   Pulse 87   Temp 97.3 °F (36.3 °C) (Temporal)   Resp 23   Ht 5' 4\" (1.626 m)   Wt 263 lb 1.6 oz (119.3 kg)   SpO2 100%   BMI 45.16 kg/m²       Patient is status post Procedure(s):  LAPAROSCOPIC SLEEVE GASTRECTOMY WITH POSSIBLE LIVER WEDGE BIOPSY AND INTRAOPERATIVE ENDOSCOPY. Nausea/Vomiting: Controlled. Postoperative hydration reviewed and adequate. Pain:      Managed. Neurological Status/Mental Status and Level of Consciousness:   Appropriately returning/progressing to baseline     Pulmonary Status:       Adequate oxygenation and airway patent. HTN trated w/ labetalol and hydralazine, with hospitalist f/u. No apparent anesthetic complications.      Signed By: Eileen Alvarado MD    April 6, 2023

## 2023-04-06 NOTE — PERIOP NOTE
Dr. Silvano Howard notified that BP remains high despite treatment- He will consult Dr. Priscilla Duffy.

## 2023-04-06 NOTE — FLOWSHEET NOTE
Transferred Pt to 80, Wanda Renner RN at bedside. Dressing CDI. Left pt stable.       04/06/23 1648   Vitals   Temp 98.7 °F (37.1 °C)   Temp Source Oral   Heart Rate 84   Resp 16   BP (!) 155/93   SpO2 100 %

## 2023-04-06 NOTE — PERIOP NOTE
Reviewed PTA medication list with patient/caregiver and patient/caregiver denies any additional medications. Patient admits to having a responsible adult care for them at home for at least 24 hours after surgery. Patient encouraged to use gown warming system and informed that using said warming gown to regulate body temperature prior to a procedure has been shown to help reduce the risks of blood clots and infection. Patient's pharmacy of choice verified and documented in PTA medication section.     Dual skin assessment & fall risk band verification completed with Judith ANTONY RN.

## 2023-04-06 NOTE — ANESTHESIA PRE PROCEDURE
Date/Time    COVID19 Please find results under separate order 01/02/2022 03:04 PM           Anesthesia Evaluation  Patient summary reviewed and Nursing notes reviewed no history of anesthetic complications:   Airway: Mallampati: II  TM distance: >3 FB   Neck ROM: full  Mouth opening: > = 3 FB   Dental: normal exam     Comment: Chipped teeth    Pulmonary:       (-) not a current smoker (quit tobacco a few months ago)                           Cardiovascular:Negative CV ROS  Exercise tolerance: good (>4 METS),                     Neuro/Psych:   Negative Neuro/Psych ROS              GI/Hepatic/Renal: Neg GI/Hepatic/Renal ROS  (+) morbid obesity          Endo/Other: Negative Endo/Other ROS                    Abdominal:             Vascular: negative vascular ROS. Other Findings:           Anesthesia Plan      general     ASA 3       Induction: intravenous. MIPS: Postoperative opioids intended. Anesthetic plan and risks discussed with patient. Plan discussed with CRNA.     Attending anesthesiologist reviewed and agrees with Preprocedure content                Amador Young MD   4/6/2023

## 2023-04-06 NOTE — CONSULTS
laying in bed in no acute distress  HEENT: atraumatic, nose normal,oropharynx clear, MMM  NECK: supple, trachea midline, no supraclavicular or submandibular adenopathy noted  EYES: conjuctiva normal, lids with out lesions, PERRL  HEART: RRR with out m/r/g, pmi nondisplaced, pulses 2+ distally  LUNGS: equal chest wall expansion, cta bl with out wheezes/rales or rhonchi  AB: soft, +BS, post op abdomen  NEURO: alert, awake and oriented x3, gait not assessed, cranial nerves intact, strength 5/5 bl UE and LE, sensation intact, reflexes nonpathological  SKIN: dry, intact, warm no breakdown noted        Laboratory Studies:    Recent Results (from the past 24 hour(s))   POC Pregnancy Urine Qual    Collection Time: 04/06/23  9:31 AM   Result Value Ref Range    Preg Test, Ur Negative NEG     TYPE AND SCREEN    Collection Time: 04/06/23  9:42 AM   Result Value Ref Range    Crossmatch expiration date 04/09/2023,2535     ABO/Rh O POSITIVE     Antibody Screen NEG

## 2023-04-06 NOTE — PERIOP NOTE
Spoke and updatedDr Brea Spencer regarding Pt's progress. Ok to transfer pt her room and will come and see the pt there. Spoke with Anesthesia regarding pt' progress also and will sign the pt out.

## 2023-04-06 NOTE — PERIOP NOTE
TRANSFER - IN REPORT:    Verbal report received from OR RN on Isa Shoulder  being received from OR for routine post-op      Report consisted of patient's Situation, Background, Assessment and   Recommendations(SBAR). Information from the following report(s) Surgery Report was reviewed with the receiving nurse. Opportunity for questions and clarification was provided. Assessment completed upon patient's arrival to unit and care assumed.

## 2023-04-06 NOTE — INTERVAL H&P NOTE
Update History & Physical    The patient's History and Physical of March 27, 2024 was reviewed with the patient and I examined the patient. There was no change. The surgical site was confirmed by the patient and me. Plan: The risks, benefits, expected outcome, and alternative to the recommended procedure have been discussed with the patient. Patient understands and wants to proceed with the procedure.      Electronically signed by Cheryl Black MD on 4/6/2023 at 9:58 AM

## 2023-04-06 NOTE — OP NOTE
placed in the usual U-shaped   configuration with a subxiphoid incision being made to accommodate the   [facility] retractor. On entering the abdomen, the patient was noted to have a   moderately fatty liver with possible evidence of early steatohepatitis. I elevated the liver and noted the   patient had no diaphragmatic hernia present. I began the operation by choosing an area 2-3 cm   proximal to the pylorus and within the gastroepiploic vessels I began to divide off these vessels individually. I moved cephalad toward short gastric vessels, which were very difficult to take down due to the proximity   to the splenic hilum. I was able to do so, clearing the entire left crural area. I then placed a Visigi tube,   impacting at the distal antrum. I then began the resection with the powered [place]   stapler using the green loads with Endopath buttress strips for the first firing tangential along the   antral region. The second and subsequent firings were used with blue  reloads and the same buttress strips reaching just past the incisura region. The remainder of the 4 vertical   firings completed the resection at the left crural region. I then tested   the pouch via the Visigi tube using dilute methylene blue,it was noted to be completely   Watertight. At this juncture due to some gastric wall thickening  of the sleeve,   I chose to oversew the staple line. I obtained a 2-0 Ethibond suture and oversewed the staple line from the   apex to the distal sleeve. This portion of the procedure took quite some time to perform as I began at   the apex of the staple line and oversewed it all the way down to the prepyloric region. The total time spent   performing this oversew was in excess of 25 minutes duration. We did perform this due to the thickness   of the gastric wall and the realization that oversewing staple lines almost certainly reduces the leak rate in   an area of high risk region of the stomach.  This went

## 2023-04-06 NOTE — PERIOP NOTE
TRANSFER - OUT REPORT:    Verbal report given to Polly Suazo 45  on Wilman Diaz  being transferred to [facility] for routine post-op       Report consisted of patient's Situation, Background, Assessment and   Recommendations(SBAR). Information from the following report(s) Nurse Handoff Report, ED SBAR, Surgery Report, Intake/Output, MAR, and Cardiac Rhythm NSR  was reviewed with the receiving nurse. [place] Assessment: No data recorded  Lines:   Peripheral IV 04/06/23 Right Antecubital (Active)   Site Assessment Clean, dry & intact 04/06/23 1323   Line Status Infusing 04/06/23 [facility] Connections checked and tightened 04/06/23 1127   Phlebitis Assessment No symptoms 04/06/23 1323   Infiltration Assessment 0 04/06/23 1323   Dressing Status Clean, dry & intact 04/06/23 1323   Dressing Type Transparent 04/06/23 1323        Opportunity for questions and clarification was provided.       Patient transported with:  Registered Nurse

## 2023-04-07 ENCOUNTER — APPOINTMENT (OUTPATIENT)
Facility: HOSPITAL | Age: 33
End: 2023-04-07
Attending: SPECIALIST
Payer: MEDICAID

## 2023-04-07 VITALS
HEART RATE: 84 BPM | OXYGEN SATURATION: 99 % | HEIGHT: 64 IN | SYSTOLIC BLOOD PRESSURE: 144 MMHG | TEMPERATURE: 98.7 F | WEIGHT: 263.1 LBS | BODY MASS INDEX: 44.92 KG/M2 | RESPIRATION RATE: 16 BRPM | DIASTOLIC BLOOD PRESSURE: 90 MMHG

## 2023-04-07 PROBLEM — R11.2 NAUSEA AND VOMITING: Status: ACTIVE | Noted: 2023-04-07

## 2023-04-07 LAB
ANION GAP SERPL CALC-SCNC: 9 MMOL/L (ref 3–18)
BUN SERPL-MCNC: 7 MG/DL (ref 7–18)
BUN/CREAT SERPL: 11 (ref 12–20)
CALCIUM SERPL-MCNC: 8.7 MG/DL (ref 8.5–10.1)
CHLORIDE SERPL-SCNC: 109 MMOL/L (ref 100–111)
CO2 SERPL-SCNC: 18 MMOL/L (ref 21–32)
CREAT SERPL-MCNC: 0.62 MG/DL (ref 0.6–1.3)
GLUCOSE SERPL-MCNC: 96 MG/DL (ref 74–99)
POTASSIUM SERPL-SCNC: 4.1 MMOL/L (ref 3.5–5.5)
SODIUM SERPL-SCNC: 136 MMOL/L (ref 136–145)

## 2023-04-07 PROCEDURE — 2500000003 HC RX 250 WO HCPCS: Performed by: SPECIALIST

## 2023-04-07 PROCEDURE — 6360000002 HC RX W HCPCS: Performed by: SPECIALIST

## 2023-04-07 PROCEDURE — 6370000000 HC RX 637 (ALT 250 FOR IP): Performed by: SPECIALIST

## 2023-04-07 PROCEDURE — 74240 X-RAY XM UPR GI TRC 1CNTRST: CPT

## 2023-04-07 PROCEDURE — 80048 BASIC METABOLIC PNL TOTAL CA: CPT

## 2023-04-07 PROCEDURE — 2580000003 HC RX 258: Performed by: SPECIALIST

## 2023-04-07 PROCEDURE — A4216 STERILE WATER/SALINE, 10 ML: HCPCS | Performed by: SPECIALIST

## 2023-04-07 PROCEDURE — 6360000004 HC RX CONTRAST MEDICATION: Performed by: SPECIALIST

## 2023-04-07 PROCEDURE — C9113 INJ PANTOPRAZOLE SODIUM, VIA: HCPCS | Performed by: SPECIALIST

## 2023-04-07 PROCEDURE — 36415 COLL VENOUS BLD VENIPUNCTURE: CPT

## 2023-04-07 RX ORDER — METOCLOPRAMIDE HYDROCHLORIDE 5 MG/ML
10 INJECTION INTRAMUSCULAR; INTRAVENOUS EVERY 6 HOURS
Status: DISCONTINUED | OUTPATIENT
Start: 2023-04-07 | End: 2023-04-07 | Stop reason: HOSPADM

## 2023-04-07 RX ORDER — SIMETHICONE 80 MG
80 TABLET,CHEWABLE ORAL EVERY 6 HOURS PRN
Status: DISCONTINUED | OUTPATIENT
Start: 2023-04-07 | End: 2023-04-07 | Stop reason: HOSPADM

## 2023-04-07 RX ORDER — OXYCODONE HYDROCHLORIDE AND ACETAMINOPHEN 5; 325 MG/1; MG/1
1 TABLET ORAL EVERY 4 HOURS PRN
Status: DISCONTINUED | OUTPATIENT
Start: 2023-04-07 | End: 2023-04-07 | Stop reason: HOSPADM

## 2023-04-07 RX ADMIN — METOCLOPRAMIDE 10 MG: 5 INJECTION, SOLUTION INTRAMUSCULAR; INTRAVENOUS at 08:06

## 2023-04-07 RX ADMIN — PANTOPRAZOLE SODIUM 40 MG: 40 INJECTION, POWDER, FOR SOLUTION INTRAVENOUS at 08:06

## 2023-04-07 RX ADMIN — ONDANSETRON 4 MG: 2 INJECTION INTRAMUSCULAR; INTRAVENOUS at 08:07

## 2023-04-07 RX ADMIN — DIATRIZOATE MEGLUMINE AND DIATRIZOATE SODIUM 30 ML: 660; 100 LIQUID ORAL; RECTAL at 07:17

## 2023-04-07 RX ADMIN — BARIUM SULFATE 50 ML: 960 POWDER, FOR SUSPENSION ORAL at 07:17

## 2023-04-07 RX ADMIN — KETOROLAC TROMETHAMINE 30 MG: 30 INJECTION, SOLUTION INTRAMUSCULAR at 03:25

## 2023-04-07 RX ADMIN — ENOXAPARIN SODIUM 40 MG: 100 INJECTION SUBCUTANEOUS at 08:15

## 2023-04-07 RX ADMIN — OXYCODONE AND ACETAMINOPHEN 1 TABLET: 5; 325 TABLET ORAL at 08:15

## 2023-04-07 RX ADMIN — KETOROLAC TROMETHAMINE 30 MG: 30 INJECTION, SOLUTION INTRAMUSCULAR at 09:52

## 2023-04-07 RX ADMIN — SODIUM CHLORIDE, PRESERVATIVE FREE 10 ML: 5 INJECTION INTRAVENOUS at 08:16

## 2023-04-07 RX ADMIN — SIMETHICONE 80 MG: 80 TABLET, CHEWABLE ORAL at 09:52

## 2023-04-07 ASSESSMENT — PAIN SCALES - GENERAL
PAINLEVEL_OUTOF10: 5
PAINLEVEL_OUTOF10: 6
PAINLEVEL_OUTOF10: 2
PAINLEVEL_OUTOF10: 3

## 2023-04-07 ASSESSMENT — PAIN DESCRIPTION - ORIENTATION
ORIENTATION: ANTERIOR
ORIENTATION: ANTERIOR

## 2023-04-07 ASSESSMENT — PAIN DESCRIPTION - DESCRIPTORS: DESCRIPTORS: ACHING;CRAMPING

## 2023-04-07 ASSESSMENT — PAIN DESCRIPTION - LOCATION
LOCATION: ABDOMEN
LOCATION: ABDOMEN

## 2023-04-07 ASSESSMENT — PAIN SCALES - WONG BAKER
WONGBAKER_NUMERICALRESPONSE: 0
WONGBAKER_NUMERICALRESPONSE: 0

## 2023-04-07 NOTE — DISCHARGE SUMMARY
Discharge Summary    Patient: Wilman Diaz               Sex: female          DOA: 4/6/2023         YOB: 1990      Age:  33 y.o.        LOS:  LOS: 1 day                Admit Date: 4/6/2023    Discharge Date: 4/7/2023    Admission Diagnoses: Morbid obesity ([facility] [E66.01]  Body mass index 40.0-44.9, adult ([facility] [Z68.41]  Morbid obesity with BMI of 40.0-44.9, adult ([facility] [E66.01, Z68.41]    Discharge Diagnoses:      Discharge Condition: Good    Hospital Course: The patient underwent  Sleeve gastrectomy on 4/6/2023. The patient tolerated the procedure well. Vital signs revealed severe HTN and  the hospitalist group was consulted. the patient was transferred to  2nd floor surgical unit without complications. The patient remained stable throughout the first night post operatively with stable vital signs and adequate urine output and pain control. Pain was controlled with Dilaudid IV and IV Tylenol . The patient on the first morning post operative was transferred to the radiology suite where they underwent a gastrograffin UGI study which showed no evidence of a leak or stricture. The drain was discontinued on POD # 1 and the patient was started on a bariatric liquid diet with protein shakes. The patient progressed throughout the day and was ambulating well and tolerating their diet. They were therefore discharged home with instructions to notify me with any issues that may arise. Significant Diagnostic Studies:   No results for input(s): HGB in the last 72 hours. No results for input(s): HCT in the last 72 hours.        Medication List        ASK your doctor about these medications      calcium carbonate 500 MG Tabs tablet  Commonly known as: OSCAL     Clindamycin-Benzoyl Per (Refr) 1.2-5 % Gel     clobetasol 0.05 % external solution  Commonly known as: TEMOVATE     enoxaparin 40 MG/0.4ML  Commonly known as: LOVENOX  Inject 0.4 milliliters subcutaneously every 12 hours     ferrous sulfate 220 (44 Fe)

## 2023-04-07 NOTE — DISCHARGE INSTRUCTIONS
[facility]  Olivia Catherine. Maninder Yee M.D., F.A.C.S.  [address]. [address] [place] [address] Rd, [address] [facility]  Office: [phone]    Fax:  [phone]    Discharge Instruction for Gastric Bypass / Sleeve Gastrectomy Patients    Diet:  Continue with the liquid diet until you are seen in the office. Make sure you sip fluids all day. Goal for total fluid intake is at least 64 ounces per day. Aim for  Gms of protein every day. Activity:  Walking is encouraged. Rest when you are tired. You may shower. You may climb stairs. Take your time. No lifting more than 10-15 lbs. If you feel discomfort during an activity, rest.  Do not drive for 1 week or until off of all narcotics. Wound Care:  Clean incisions with soap and water when in the shower. Pat dry. Leave steri-strips on until they fall off. A small amount of drainage may be present from the incisions. Contact the office if you notice an increase in drainage, an odor, increased redness, or fever > 100.5. Medications: You will receive a prescription for pain medication and Prilosec at the time of discharge. For upset stomach you may take over the counter medications such as Maalox, Mylanta, or Pepto Bismol. Gas X works very well for bloating when eating or drinking  You may take Tylenol  Non-aspirin based arthritis medications may be resumed   Take a childrens or adult chewable multivitamin. Milk of Magnesia will help with constipation. It is fine to take your usual home medications. Blood pressure medications should be continued after surgery. Diabetic medications can frequently be reduced very quickly after surgery. Diabetics should continue to monitor blood sugars frequently after surgery and contact the prescribing physician for any questions. Follow-Up Appointment:  If you do not already have a follow-up appointment scheduled, contact the office in the next few days to obtain one.   It is usually scheduled for

## 2023-04-07 NOTE — NURSE NAVIGATOR
Patient sitting on side of bed sipping protein drink and tolerating well. Vitals:   Blood pressure 132/74, pulse 86, temperature 98.9 °F (37.2 °C), temperature source Oral, resp. rate 18, height 5' 4\" (1.626 m), weight 263 lb 1.6 oz (119.3 kg), last menstrual period 03/15/2023, SpO2 100 %. Output: reviewed, and patient verified urinating clear, yellow urine. Pulmonary: Clear in all lobes  Cardiac: Regular rate and rhythm  Abdomen: Bowel sounds hypo-active x4. Lap sites without erythema, swelling,  and/or drainage. RAMIN drain with a small amount of serosanguinous drainage. SCD's: Positioned and operating WNL    Patient with expected pain, but is being managed and is currently tolerable. No nausea and/or vomiting. Patient has been ambulating the halls. Patient has not had the opportunity to swallow pills. Post-op diet progression discussed with patient. Patient to be discharged on a bariatric full liquid diet. Patient verbalized understanding of liquid diet for next two weeks until first post-op visit. Goal of four ounces per hour with one ounce being protein was clearly understood. Medications were discussed (i.e., pain- Percocet, Tylenol, not to use aspirin or ibuprofen based products, as well as steroids). Constipation was discussed and ways to alleviate it were discussed. Education completed on IS use and to ambulate every hour when at home. Patient completed a return demonstration on IS with no issues or concerns from this RN. Lovenox and Percocet filled and in the home. Lovenox and Percocet education provided, and patient verbalized understanding. Patient has chewable multi-vitamin, probiotic, and Prilosec in the home; they were reviewed. Ketosis was also reviewed. Patient given a report card to record intake and a handout to support bedside education. All questions were answered by this RN, and patient verbalized understanding to all education provided.   Goals for discharge were

## 2023-04-07 NOTE — PROGRESS NOTES
Bariatric Surgery                POD #1    BP (!) 143/90   Pulse 85   Temp 98.5 °F (36.9 °C) (Oral)   Resp 18   Ht 5' 4\" (1.626 m)   Wt 263 lb 1.6 oz (119.3 kg)   LMP 03/15/2023   SpO2 100%   BMI 45.16 kg/m²   Patient has minimal complaints of pain, minimal nausea noted     Exam:  Appears well in no distress  Lungs- clear bilaterally  Abd - soft, incisions look good without erythema           RAMIN with minimal serosanguinous output  Extremities- no new edema or swelling    UGI - no obstrustion or leak    Data Review:    Labs: Results:       Chemistry Recent Labs     04/07/23  0322      K 4.1      CO2 18*   BUN 7      CBC w/Diff No results for input(s): WBC, RBC, HGB, HCT, PLT, RETIC in the last 72 hours. Invalid input(s): GRANS, LYMPH, EOS   Coagulation No results for input(s): INR, APTT in the last 72 hours. Invalid input(s): PTP    Liver Enzymes No results for input(s): TP, ALB in the last 72 hours. Invalid input(s): TBIL, AP, SGOT, GPT, DBIL       Assessment/Plan: S/P  Sleeve gastrectomy - doing better from Hypertension standpoint    1. Start bariatric diet and protein shakes  2. D/C IV pain meds and start PO pain meds  3. D/C RAMIN drain  4. Likley PM D/C if  Cont ok and tolerate PO
Cachorro Girard care of patient at this time. Patient currently out of room for GI screen. 0800  Patient AAOx3. PIV is C/D/I with fluids infusing, no s/s of infiltration or phlebitis. VSS on RA. Breath sounds clear bilaterally. Patient reaching 1000 on IS. Gauze dressing to 5 abdominal lap sites are C/D/I. RAMIN drain charged and patent, dressing is C/D/I. Patient not passing flatus. SCDs intact to BLE. No other concerns at this time. Possessions within reach, will continue to monitor. 3563  Patient states pain 6/10, PRN pain meds given per mar. 80mL serosanguinous fluid emptied from RAMIN drain. 1003  10mL serosanguinous fluid emptied from RAMIN drain. RAMIN drain removed, catheter tip intact upon removal. Covered with gauze and tape. 0484 31 29 02  Patient ambulating in hallway    1349  AVS meds reviewed with Brittani Veloz RN. Discharge paperwork reviewed with patient, all questions answered. No other concerns at this time. Both PIV's removed, catheter tips intact upon removal. Covered with gauze and tape.
D/C Plan: Home with family to drive within 82-13 hours    Patient had elective procedure: Procedure(s):  LAPAROSCOPIC SLEEVE GASTRECTOMY  LIVER WEDGE BIOPSY  INTRAOPERATIVE ENDOSCOPY WITH BIOPSY  OVER SEW ENTIRE GASTRIC STAPLE LINE. Patient is independent at baseline. CM anticipates patient to discharge home with family assistance and physician follow up when medically cleared. No CM discharge needs identified. Patient ride home with family at discharge.
[id number]  Patient arrived to unit at this time. Patient oriented X3 and sleepy, arouses to stimuli. PIV is C/D/I with fluids infusing, no s/s of infiltration or phlebitis. VSS on RA. Breath sounds clear bilaterally. Gauze dressing to 5 abdominal lap sites are C/D/I. RAMIN drain charged and patent, dressing is C/D/I. Patient not passing flatus. SCDs intact to BLE. No other concerns at this time. Possessions within reach, will continue to monitor.     Patient voided in PACU per Johnson Coppola  Patient states pain 7/10, PRN pain meds given per mar
Pt ambulated unit hallways x2. NAD noted, tolerated well.  Up to bathroom with stand by assist.
4.1      CO2 18*   BUN 7   ,No results found for: LACTA   CBC w/Diff No results for input(s): WBC, RBC, HGB, HCT, PLT in the last 72 hours. Invalid input(s): GRANS, LYMPH, EOS   Cardiac Enzymes No results found for: CKTOTAL, CKMB, CKMBINDEX, TROPONINI,No results found for: BNP   Coagulation No results for input(s): INR, APTT in the last 72 hours. Invalid input(s): PTP    Lipid Panel No results found for: CHOL, CHOLPOCT, CHOLX, CHLST, CHOLV, [id number], HDL, HDLC, LDL, LDLC, 585596, VLDLC, VLDL, TGLX, TRIGL   Pancreas No results for input(s): LIPASE in the last 72 hours. ,No results for input(s): AMYLASE in the last 72 hours. Liver Enzymes No results for input(s): TP, ALB in the last 72 hours. Invalid input(s): TBIL, AP, SGOT, GPT, DBIL   Thyroid Studies Lab Results   Component Value Date/Time    TSH 0.824 10/12/2022 09:44 AM        Procedures/imaging: see electronic medical records for all procedures/Xrays and details which were not copied into this note but were reviewed prior to creation of Plan    TIME: E/M Time spent with patient and patient care issues: [] 15-20 min  [] 21-30 min  [x] 31-44 min  [] 45 min or more. This time also includes physician non-face-to-face service time visit on the date of service such as  Preparing to see the patient (eg, review of tests)  Obtaining and/or reviewing separately obtained history  Performing a medically necessary appropriate examination and/or evaluation  Counseling and educating the patient/family/caregiver  Ordering medications, tests, or procedures  Referring and communicating with other health care professionals as needed  Documenting clinical information in the electronic or other health record  Independently interpreting results (not reported separately) and communicating results to the patient/family/caregiver  Care coordination and discharge planning with Case Management.

## 2023-04-07 NOTE — PROCEDURES
[facility]    Upper GI Procedure Report      Moustapha Quiles    Medical Record [id number]    1990    Date of Service - April 7, 2023    Pre-Op Diagnosis - patient is status post sleeve resection performed by myself 24 hours ago. They now present for UGI to assess their post surgical anatomy. Post-Op Diagnosis -same    Procedure - UGI study with barium    Surgeon - Maryam Arias MD    Assistant - None    Complications - None    Specimens - None    Implants - None    Estimate Blood Loss - None    Statement of Medical Necessity - Need for radiologic evaluation prior to further management of their care. .    Procedure -the patient was brought to the fluoroscopy suite where they were given thin barium. On swallowing the barium the patient was noted to have normal peristalsis of their esophagus with progressive flow into the distal esophagus. Specific findings of the distal esophagus revealed that they did note have a hiatal hernia. Contrast flowed normally through the esophagus and into a properly sized sleeve stomach without reflux or obstruction or signs of stricture. The stomach filled in a timely manner and emptied into the duodenum without issue or hesitation. The anatomy was normal for the timeframe with no stricture or obstruction or any other abnormality. Given the benign findings of today's exam we will proceed with liquid diet and start PO medications.     Maryam Arias MD

## 2023-04-18 ENCOUNTER — HOSPITAL ENCOUNTER (OUTPATIENT)
Facility: HOSPITAL | Age: 33
Discharge: HOME | End: 2023-04-21

## 2023-04-18 ENCOUNTER — TELEPHONE (OUTPATIENT)
Facility: HOSPITAL | Age: 33
End: 2023-04-18

## 2023-04-18 NOTE — TELEPHONE ENCOUNTER
4/18/2023:  Attempted to contact patient today at  11:00 am in reference to: diet tolerance and  to discuss diet progression as pt is approx. 2 wks S/P laparoscopic sleeve gastrectomy procedure. .  Patient was left a voicemail to contact me. My contact information was provided.     Kenan Caro, MS, RD

## 2023-04-18 NOTE — PROGRESS NOTES
Nutrition Note      Spoke with Amy Saeed  today. Pt is approx. 2 weeks S/P laparoscopic sleeve gastrectomy procedure. Tolerating liquid diet very well. Protein shake intake: 3 Premier protein supplement shakes/day, Although pt states that she is forcing herself to drink her protein supplements. Pt states that she has tried Premier RTD, Isopure chocolate whey protein isolate powder mixed with water, Ensure Max protein RTD, Lr RTD, and , but did not like the flavor of any of them. Pt states that she is currently drinking a  [id number] supplement, she dilutes it with water to be able to drink it. Reviewed appropriate options to make supplements less sweet, diluted, or better tolerated using other foods/fluids which pt likes and has tolerated with pt. Fluid intake: 58 oz/day. Foods being consumed include Crystal Light, broth, low-fat cream of chicken soup (strained & diluted with water), sf pudding, sf popsicles, and yogurt. No complaints other than flavor issues with protein supplements. Wt: 245 lbs    Pre-op Wt: 261 lbs    EBW: 116  lbs   14 % EBWL    Weight is stated, visit was virtual.    Activity: walking around complex for 20-30 min/day and/or You Tube video/day x 7 d/wk. Supplements:  [x] Burgaw's Complete Chewable MVI BID  [x] Probiotic QD  [x]Prilosec QD    Pt given one on one diet education over the phone. Reviewed diet progression for weeks 3-4. Patient appears to have a good understanding of the diet progression, food choices, and dietary/exercise habits for successful weight loss and nourishment after surgery. Reviewed pp. 42-47 of the patient education book. Discussed: post-op diet progression, including soft/pureed high protein, low fat, low sugar food recommendations; proper food group choices;  consumption of food and liquids, and consumption of adequate no-sugar, no-caffeine, no carbonation liquids.     We reviewed appropriate food choices, meal

## 2023-04-24 ENCOUNTER — OFFICE VISIT (OUTPATIENT)
Age: 33
End: 2023-04-24

## 2023-04-24 VITALS
SYSTOLIC BLOOD PRESSURE: 133 MMHG | BODY MASS INDEX: 41.35 KG/M2 | TEMPERATURE: 97.3 F | HEIGHT: 64 IN | OXYGEN SATURATION: 100 % | DIASTOLIC BLOOD PRESSURE: 67 MMHG | WEIGHT: 242.2 LBS | HEART RATE: 86 BPM

## 2023-04-24 DIAGNOSIS — Z09 POSTOPERATIVE EXAMINATION: Primary | ICD-10-CM

## 2023-04-24 PROCEDURE — 99024 POSTOP FOLLOW-UP VISIT: CPT | Performed by: NURSE PRACTITIONER

## 2023-04-24 RX ORDER — OMEPRAZOLE 20 MG/1
20 CAPSULE, DELAYED RELEASE ORAL DAILY
COMMUNITY

## 2023-04-24 NOTE — PROGRESS NOTES
Subjective:      Marcio Mckeon is a 33 y.o. female is now 18 days status post laparoscopic sleeve gastrectomy. Doing well overall. She has lost a total of 19 pounds since surgery. Body mass index is 41.57 kg/m². Currently on a liquid diet without difficulty. Taking in 58oz fluid daily. Sources of protein include protein shakes. 20-30 min of activity 7 days a week, including walking. Bowel movements are regular. The patient is not having any pain. . The patient is  compliant with multivitamins. Surgery related complications: NA.  Liver bx report reviewed with patient. Stomach bx report reviewed with patient. Ambulatory Bariatric Summary 4/24/2023 4/24/2023 4/7/2023 4/7/2023 4/7/2023 4/6/2023 1/8/1483   Systolic 678 358 978 099 129 707 098   Diastolic 67 71 90 74 90 91 105   Pulse - 86 84 86 85 79 83   Temp - 97.3 98.7 98.9 98.5 98.1 97.5   Resp - - 16 18 18 18 18   Weight - 242.2 - - - - -   Height - 64 - - - - -   BMI - 41.7 kg/m2 - - - - -          Comorbidities:      Hypertension: N/A  Diabetes: N/A  Obstructive Sleep Apnea: N/A   Hyperlipidemia: N/A  Stress Urinary Incontinence: N/A  Gastroesophageal Reflux:N/A  Weight related arthropathy:N/A    Denies diagnosis of COVID-19 since surgery.      Patient Active Problem List   Diagnosis    Morbid obesity with BMI of 45.0-49.9, adult ([facility]    Depression    Morbid obesity ([facility]    Calculus of kidney    Former smoker    Anemia    Smoker    Functional dyspepsia    Morbid obesity with body mass index (BMI) of 40.0 to 49.9 (HCC)    Morbid obesity with BMI of 40.0-44.9, adult (HCC)    Elevated BP without diagnosis of hypertension    Nausea and vomiting        Past Medical History:   Diagnosis Date    Anemia     Anxiety     Calculus of kidney     history of    Depression     Former smoker     Functional dyspepsia     avoids trigger foods    Morbid obesity ([facility] [address] [facility]     Morbid obesity with body mass index (BMI) of 40.0 to 49.9 [facility])        Past Surgical History:

## 2023-05-08 ENCOUNTER — TELEPHONE (OUTPATIENT)
Facility: HOSPITAL | Age: 33
End: 2023-05-08

## 2023-05-08 NOTE — TELEPHONE ENCOUNTER
5/8/2023:  Attempted to contact patient today at  10:30 am in reference to: assessment of post-op diet tolerance and to discuss diet and vitamin/mineral supplement progression, as pt is approx. 1 mo S/P laparoscopic sleeve gastrectomy procedure. Patient was left a voicemail to contact me. My contact information was provided.        Shadi Koch, MS, RD

## 2023-05-09 ENCOUNTER — HOSPITAL ENCOUNTER (OUTPATIENT)
Facility: HOSPITAL | Age: 33
Discharge: HOME | End: 2023-05-12

## 2023-05-09 VITALS — BODY MASS INDEX: 40.12 KG/M2 | HEIGHT: 64 IN | WEIGHT: 235 LBS

## 2023-05-09 NOTE — PROGRESS NOTES
Nutrition Note     Patient is a 33 y.o.  female approx. 1 mo S/P laparoscopic sleeve gastrectomy procedure. Vitals:    05/09/23 1015   Weight: 235 lb (106.6 kg)   Height: 5' 4\" (1.626 m)      Body mass index is 40.34 kg/m². Pt current weight stated, visit was virtual.      Pre-op Wt: 261 lbs                  EBW: 116 lbs     Pt has lost 26 lbs since surgery on 4/6/23. Pt has lost 22 % EBW. Pt is currently on a soft food diet without difficulty. Patient is hydrating with  64+ ounces, daily. Patient is tolerating the following sources of protein:  2 Premier protein supplement shakes/day, chicken, shrimp, fish (ibrahima/tuna), fat-free cottage cheese, eggs, low-fat string cheese, and  [place] yogurt. Patient is exercising by walking briskly for 60 min/day  x 3-4 d/wk. Complications  [] Readmittance  [] Return to OR  [] Complications: N/A  [] Needed to come to [facility] for IV Fluid re-hydration  [] ER Trips: N/A  [x] None     Pt [x] is [] is not taking her Prilosec. Supplements:  [x] Selvin's Complete MVI  BID  [x] Probiotic     Reviewed the following with patient:  Advance diet to solid phase. Reminded to measure portions, continue high protein, low carbohydrate diet. Reminded to eat regularly, to eat slowly & not to drink with meals. Refer to the handbook and video. Continue multi-vitamin with iron and add the following supplements  (as listed in handbook):  Calcium citrate: 1,500 mg/d, taken in doses of 500 mg TID, 2 hours apart from previous calcium citrate doses and MVI doses  Vitamin B-complex: follow directions on supplement, 1-2 capsules daily. Vitamin B12: 1,000 mcg daily, taken sublingually   Vitamin D3: 3,000 IU per day  Can stop probiotic, if desired, or needed for economical reasons. Continue cardio exercise and add resistance exercises. Discussed with patient. Minimum of 150 min vigorous activity/week recommended.   Encouraged to attend monthly support

## 2024-04-18 ENCOUNTER — TELEPHONE (OUTPATIENT)
Age: 34
End: 2024-04-18

## 2025-06-06 ENCOUNTER — HOSPITAL ENCOUNTER (EMERGENCY)
Facility: HOSPITAL | Age: 35
Discharge: HOME | End: 2025-06-06

## 2025-06-06 VITALS
TEMPERATURE: 97.3 F | SYSTOLIC BLOOD PRESSURE: 133 MMHG | HEART RATE: 84 BPM | HEIGHT: 63 IN | WEIGHT: 130 LBS | DIASTOLIC BLOOD PRESSURE: 86 MMHG | BODY MASS INDEX: 23.04 KG/M2 | RESPIRATION RATE: 16 BRPM | OXYGEN SATURATION: 100 %

## 2025-06-06 DIAGNOSIS — Z11.3 SCREENING FOR STD (SEXUALLY TRANSMITTED DISEASE): Primary | ICD-10-CM

## 2025-06-06 LAB
APPEARANCE UR: CLEAR
BACTERIA URNS QL MICRO: NEGATIVE /HPF
BILIRUB UR QL: NEGATIVE
C TRACH RRNA SPEC QL NAA+PROBE: NEGATIVE
COLOR UR: ABNORMAL
EPITH CASTS URNS QL MICRO: ABNORMAL /LPF (ref 0–5)
GLUCOSE UR STRIP.AUTO-MCNC: NEGATIVE MG/DL
HCG UR QL: NEGATIVE
HGB UR QL STRIP: NEGATIVE
KETONES UR QL STRIP.AUTO: ABNORMAL MG/DL
KOH PREP SPEC: NORMAL
LEUKOCYTE ESTERASE UR QL STRIP.AUTO: ABNORMAL
MUCOUS THREADS URNS QL MICRO: ABNORMAL /LPF
N GONORRHOEA RRNA SPEC QL NAA+PROBE: NEGATIVE
NITRITE UR QL STRIP.AUTO: NEGATIVE
PH UR STRIP: 6 (ref 5–8)
PROT UR STRIP-MCNC: 30 MG/DL
RBC #/AREA URNS HPF: NEGATIVE /HPF (ref 0–5)
SERVICE CMNT-IMP: NORMAL
SERVICE CMNT-IMP: NORMAL
SP GR UR REFRACTOMETRY: >1.03 (ref 1–1.03)
SPECIMEN SOURCE: NORMAL
UROBILINOGEN UR QL STRIP.AUTO: 1 EU/DL (ref 0.2–1)
WBC URNS QL MICRO: ABNORMAL /HPF (ref 0–5)
WET PREP GENITAL: NORMAL
WET PREP GENITAL: NORMAL

## 2025-06-06 PROCEDURE — 87591 N.GONORRHOEAE DNA AMP PROB: CPT

## 2025-06-06 PROCEDURE — 87210 SMEAR WET MOUNT SALINE/INK: CPT

## 2025-06-06 PROCEDURE — 87491 CHLMYD TRACH DNA AMP PROBE: CPT

## 2025-06-06 PROCEDURE — 81025 URINE PREGNANCY TEST: CPT

## 2025-06-06 PROCEDURE — 99283 EMERGENCY DEPT VISIT LOW MDM: CPT

## 2025-06-06 PROCEDURE — 81001 URINALYSIS AUTO W/SCOPE: CPT

## 2025-06-06 RX ORDER — METRONIDAZOLE 500 MG/1
500 TABLET ORAL 2 TIMES DAILY
Qty: 14 TABLET | Refills: 0 | Status: SHIPPED | OUTPATIENT
Start: 2025-06-06 | End: 2025-06-13

## 2025-06-06 ASSESSMENT — PAIN - FUNCTIONAL ASSESSMENT: PAIN_FUNCTIONAL_ASSESSMENT: NONE - DENIES PAIN

## 2025-06-06 NOTE — ED PROVIDER NOTES
[facility]  EMERGENCY DEPARTMENT ENCOUNTER       Pt Name: Blessing Gregory  MRN: [id number]  Birthdate 1990  Date of Evaluation: 6/6/2025  Provider: Sofia Moss PA-C   PCP: Hector Evans II, DO  Note Started: 9:35 AM 6/6/25     CHIEF COMPLAINT       Chief Complaint   Patient presents with    Vaginal Discharge        HISTORY OF PRESENT ILLNESS: 1 or more elements      History From: Patient  None     Blessing Gregory is a 35 y.o. female who presents to the ED today for STD screening.  Patient states that she does not believe that she has an STD however states her and her partner plan to not use condoms and both decided to get testing before hand.  Does report that she sometimes gets yeast infections secondary to the fact that she had a gastric sleeve and certain things that she eats now will give her yeast infections.  Constitutional symptoms reported     Nursing Notes were all reviewed and agreed with or any disagreements were addressed in the HPI.     REVIEW OF SYSTEMS      Review of Systems     Positives and Pertinent negatives as per HPI.    PAST HISTORY     Past Medical History:  Past Medical History:   Diagnosis Date    Anemia     Anxiety     Calculus of kidney     history of    Depression     Former smoker     Functional dyspepsia     avoids trigger foods    Morbid obesity (HCC)     Morbid obesity with body mass index (BMI) of 40.0 to 49.9 (HCC)        Past Surgical History:  Past Surgical History:   Procedure Laterality Date    CHOLECYSTECTOMY      CYSTOSCOPY      stone extraction    SLEEVE GASTRECTOMY N/A 4/6/2023    LAPAROSCOPIC SLEEVE GASTRECTOMY WITH POSSIBLE LIVER WEDGE BIOPSY AND INTRAOPERATIVE ENDOSCOPY performed by Luiz Armendariz MD at [facility] MAIN OR       Family History:  Family History   Problem Relation Age of Onset    Diabetes Father     Obesity Father        Social History:  Social History     Tobacco Use    Smoking status: Former     Current packs/day: 0.00      hours as needed for Nausea or Vomiting     Probiotic-10 Chew     tretinoin 0.025 % cream  Commonly known as: RETIN-A     vitamin B-12 100 MCG tablet  Commonly known as: CYANOCOBALAMIN               Where to Get Your Medications        These medications were sent to [facility]/pharmacy #[id number] - [place], [place] - [address] - P [phone] - F [phone] [address] [address] [facility] [address]      Phone: [phone]   metroNIDAZOLE 500 MG tablet           DISCONTINUED MEDICATIONS:  Current Discharge Medication List          I have seen and evaluated the patient autonomously. My supervision physician was on site and available for consultation if needed.     I am the Primary Clinician of Record.   Sofia Moss PA-C (electronically signed)    (Please note that parts of this dictation were completed with voice recognition software. Quite often unanticipated grammatical, syntax, homophones, and other interpretive errors are inadvertently transcribed by the computer software. Please disregards these errors. Please excuse any errors that have escaped final proofreading.)        Sofia Moss PA-C  06/06/25 0937

## 2025-06-07 ENCOUNTER — HOSPITAL ENCOUNTER (EMERGENCY)
Facility: HOSPITAL | Age: 35
Discharge: HOME | End: 2025-06-07
Attending: EMERGENCY MEDICINE

## 2025-06-07 VITALS
WEIGHT: 130 LBS | DIASTOLIC BLOOD PRESSURE: 93 MMHG | BODY MASS INDEX: 23.04 KG/M2 | HEART RATE: 80 BPM | RESPIRATION RATE: 18 BRPM | TEMPERATURE: 97.5 F | HEIGHT: 63 IN | SYSTOLIC BLOOD PRESSURE: 148 MMHG | OXYGEN SATURATION: 100 %

## 2025-06-07 DIAGNOSIS — Z13.9 ENCOUNTER FOR MEDICAL SCREENING EXAMINATION: Primary | ICD-10-CM

## 2025-06-07 PROCEDURE — 99282 EMERGENCY DEPT VISIT SF MDM: CPT

## 2025-06-07 NOTE — ED NOTES
RN went to room to d/c patient. States \"get the fuck out of here\" \"I dont' want those paper\" patient walked out of ed

## 2025-06-07 NOTE — ED TRIAGE NOTES
Patient arrives via private vehicle for possible STD exposure via tattoo. Patient states that their pain/symptoms started months ago after she got a tattoo. Patient is concerned with possibly sharyn HIV due to getting a tattoo, denies having symptoms, but wanted to be on the safe side.    Active Ambulatory Problems     Diagnosis Date Noted    Morbid obesity with BMI of 45.0-49.9, adult ([facility])     Depression 12/16/2015    Morbid obesity ([facility]) 04/16/2013    Calculus of kidney     Former smoker     Anemia     Smoker 10/12/2022    Functional dyspepsia 10/12/2022    Morbid obesity with body mass index (BMI) of 40.0 to 49.9 ([facility]) 04/06/2023    Morbid obesity with BMI of 40.0-44.9, adult ([facility]) 04/06/2023    Elevated BP without diagnosis of hypertension 04/06/2023    Nausea and vomiting 04/07/2023     Resolved Ambulatory Problems     Diagnosis Date Noted    Marijuana use 10/12/2022     Past Medical History:   Diagnosis Date    Anxiety        A&Ox4.

## 2025-06-07 NOTE — DISCHARGE INSTRUCTIONS
Follow-up with you can use [facility] health department, [address]. [place] you can use [place]  [phone]

## 2025-06-07 NOTE — ED PROVIDER NOTES
[facility]  EMERGENCY DEPARTMENT ENCOUNTER       Pt Name: Blessing Gregory  MRN: [id number]  Birthdate 1990  Date of evaluation: 2025  Provider: Luana Strickland MD   PCP: Hector Evans II, DO  Note Started: 10:08 AM 25     CHIEF COMPLAINT       Chief Complaint   Patient presents with    Exposure to STD        HISTORY OF PRESENT ILLNESS: 1 or more elements      History From: Patient  History limited by: Nothing     Blessing Gregory is a 35 y.o. female who presents to ED and requesting HIV test.  She reports had tattoos in the face and several other places.  She thinks she was exposed to HIV.  Also reports has been losing weight.  She reports is no insurance and has little kids at home.  She denies vaginal discharge or any urinary symptoms at     Nursing Notes were all reviewed and agreed with or any disagreements were addressed in the HPI.     REVIEW OF SYSTEMS      Review of Systems     Positives and Pertinent negatives as per HPI.    PAST HISTORY     Past Medical History:  Past Medical History:   Diagnosis Date    Anemia     Anxiety     Calculus of kidney     history of    Depression     Former smoker     Functional dyspepsia     avoids trigger foods    Morbid obesity (HCC)     Morbid obesity with body mass index (BMI) of 40.0 to 49.9 (HCC)        Past Surgical History:  Past Surgical History:   Procedure Laterality Date    CHOLECYSTECTOMY      CYSTOSCOPY      stone extraction    SLEEVE GASTRECTOMY N/A 2023    LAPAROSCOPIC SLEEVE GASTRECTOMY WITH POSSIBLE LIVER WEDGE BIOPSY AND INTRAOPERATIVE ENDOSCOPY performed by Luiz Armendariz MD at [facility] MAIN OR       Family History:  Family History   Problem Relation Age of Onset    Diabetes Father     Obesity Father        Social History:  Social History     Tobacco Use    Smoking status: Former     Current packs/day: 0.00     Types: Cigarettes     Quit date: 2022     Years since quittin.7    Smokeless tobacco: Never   Vaping

## (undated) DEVICE — SYRINGE,TOOMEY,IRRIGATION,70CC,STERILE: Brand: MEDLINE

## (undated) DEVICE — TROCAR ENDOSCP BLDELSS 12X100 MM W/ HNDL STBL SL OPT TIP

## (undated) DEVICE — SUTURE PDS II SZ 0 L27IN ABSRB VLT L26MM CT-2 1/2 CIR Z334H

## (undated) DEVICE — SUTURE ETHIB EXCL BR GRN TAPR PT 2-0 30 X563H X563H

## (undated) DEVICE — PAD GROUNDING UNIVERSAL SPLIT UNCORDED

## (undated) DEVICE — RELOAD STPL H4.1X2MM DIA60MM THCK TISS GRN 6 ROW PWR GST B

## (undated) DEVICE — SUTURE ETHLN SZ 3-0 L30IN NONABSORBABLE BLK FSL L30MM 3/8 1671H

## (undated) DEVICE — RELOAD STPL L60MM H1.5-3.6MM REG TISS BLU GRIPPING SURF B

## (undated) DEVICE — Device

## (undated) DEVICE — SOLUTION LACTATED RINGERS INJECTION USP

## (undated) DEVICE — TROCAR LAP L100MM DIA5MM BLDELSS W/ STBL SL ENDOPATH XCEL

## (undated) DEVICE — STAPLER SKIN L440MM 32MM LNG 12 FIRING B FRM PWR + GRIPPING

## (undated) DEVICE — TUBING, SUCTION, 1/4" X 12', STRAIGHT: Brand: MEDLINE

## (undated) DEVICE — SEALANT TISS 10 CC FOR HUM FIBRIN VISTASEAL

## (undated) DEVICE — APPLIER CLP L SHFT DIA12MM 20 ROT MULT LIGACLP

## (undated) DEVICE — ENDOSCOPY PUMP TUBING/ CAP SET: Brand: ERBE

## (undated) DEVICE — GOWN ISOLATN REG BLU POLY UNISX W/ THMB LOOP

## (undated) DEVICE — TROCAR REPROC BLADELESS ENDOPATH XCEL 5X100MM

## (undated) DEVICE — SOLUTION SURG PREP 26 CC PURPREP

## (undated) DEVICE — STRIP SKIN CLSR W1XL5IN NYLON REINF CURAD STERIL

## (undated) DEVICE — APPLICATOR LAP 35 CM 2 RIGID VISTASEAL

## (undated) DEVICE — KIT SUTURING DEVICE M-CLOSE

## (undated) DEVICE — TROCAR ENDOSCP L100MM DIA12MM STBL SL BLDELSS ENDOPATH XCEL

## (undated) DEVICE — GARMENT,MEDLINE,DVT,INT,CALF,MED, GEN2: Brand: MEDLINE

## (undated) DEVICE — TROCAR ENDOSCP L100MM DIA15MM BLDELSS STBL SL ENDOPATH XCEL

## (undated) DEVICE — SOLUTION IRRIG 500ML 0.9% SOD CHLO USP POUR PLAS BTL

## (undated) DEVICE — FORCEPS BX OVL CUP SERR DISP CAP L 240CM RAD JAW 4

## (undated) DEVICE — MAJ-1414 SINGLE USE ADPATER BIOPSY VALV: Brand: SINGLE USE ADAPTOR BIOPSY VALVE

## (undated) DEVICE — BARIATRIC: Brand: MEDLINE INDUSTRIES, INC.

## (undated) DEVICE — VISIGI 3D®  CALIBRATION SYSTEM  SIZE 36FR STD W/ BULB: Brand: BOEHRINGER® VISIGI 3D™ SLEEVE GASTRECTOMY CALIBRATION SYSTEM, SIZE 36FR W/BULB

## (undated) DEVICE — STERILE POLYISOPRENE POWDER-FREE SURGICAL GLOVES: Brand: PROTEXIS

## (undated) DEVICE — STAPLER SKIN LN REINF 60 MM ECHELON ENDOPATH

## (undated) DEVICE — SHEARS LAP L45CM DIA5MM ULTRASONIC CRV TIP ADV HEMSTAS HARM

## (undated) DEVICE — SET TBNG DISP TIP FOR AHTO

## (undated) DEVICE — SUTURE MCRYL + SZ 4-0 L27IN ABSRB UD L19MM PS-2 3/8 CIR MCP426H

## (undated) DEVICE — STRAP,POSITIONING,KNEE/BODY,FOAM,4X60": Brand: MEDLINE